# Patient Record
Sex: FEMALE | Race: WHITE | NOT HISPANIC OR LATINO | Employment: FULL TIME | URBAN - METROPOLITAN AREA
[De-identification: names, ages, dates, MRNs, and addresses within clinical notes are randomized per-mention and may not be internally consistent; named-entity substitution may affect disease eponyms.]

---

## 2023-01-18 PROBLEM — N97.9 FEMALE INFERTILITY: Status: ACTIVE | Noted: 2023-01-18

## 2023-01-18 PROBLEM — G43.909 MIGRAINE: Status: ACTIVE | Noted: 2023-01-18

## 2023-01-18 PROBLEM — Z31.41 FERTILITY TESTING: Status: ACTIVE | Noted: 2023-01-18

## 2023-01-18 PROBLEM — O36.80X0 EXAMINATION TO DETERMINE FETAL VIABILITY OF PREGNANCY (HHS-HCC): Status: ACTIVE | Noted: 2023-01-18

## 2023-01-18 PROBLEM — J01.90 ACUTE SINUSITIS: Status: ACTIVE | Noted: 2023-01-18

## 2023-01-18 PROBLEM — U07.1 COVID-19: Status: ACTIVE | Noted: 2023-01-18

## 2023-01-18 PROBLEM — M72.2 PLANTAR FASCIITIS, LEFT: Status: ACTIVE | Noted: 2023-01-18

## 2023-01-18 PROBLEM — R76.0 ANTIPHOSPHOLIPID ANTIBODY POSITIVE: Status: ACTIVE | Noted: 2023-01-18

## 2023-01-18 PROBLEM — F43.21 ADJUSTMENT DISORDER WITH DEPRESSED MOOD: Status: ACTIVE | Noted: 2023-01-18

## 2023-01-18 PROBLEM — J01.90 ACUTE RHINOSINUSITIS: Status: ACTIVE | Noted: 2023-01-18

## 2023-01-18 PROBLEM — Z12.4 ENCOUNTER FOR PAPANICOLAOU SMEAR OF CERVIX: Status: ACTIVE | Noted: 2023-01-18

## 2023-01-18 PROBLEM — E55.9 VITAMIN D DEFICIENCY: Status: ACTIVE | Noted: 2023-01-18

## 2023-01-18 PROBLEM — F32.A DEPRESSION: Status: ACTIVE | Noted: 2023-01-18

## 2023-01-18 RX ORDER — VIT C/E/ZN/COPPR/LUTEIN/ZEAXAN 250MG-90MG
25 CAPSULE ORAL DAILY
COMMUNITY
End: 2024-04-04 | Stop reason: ALTCHOICE

## 2023-01-18 RX ORDER — CALCIUM CARBONATE 300MG(750)
400 TABLET,CHEWABLE ORAL DAILY
COMMUNITY

## 2023-01-18 RX ORDER — DOXYCYCLINE 100 MG/1
100 TABLET ORAL 2 TIMES DAILY
COMMUNITY
End: 2023-03-12 | Stop reason: ALTCHOICE

## 2023-01-18 RX ORDER — AZELASTINE 1 MG/ML
1 SPRAY, METERED NASAL 2 TIMES DAILY
COMMUNITY
End: 2023-03-12 | Stop reason: ALTCHOICE

## 2023-01-18 RX ORDER — ASCORBIC ACID 500 MG
500 TABLET ORAL DAILY
COMMUNITY
End: 2024-04-03 | Stop reason: ALTCHOICE

## 2023-01-19 PROBLEM — Z12.4 ENCOUNTER FOR PAPANICOLAOU SMEAR OF CERVIX: Status: RESOLVED | Noted: 2023-01-18 | Resolved: 2023-01-19

## 2023-01-19 PROBLEM — Z31.41 FERTILITY TESTING: Status: RESOLVED | Noted: 2023-01-18 | Resolved: 2023-01-19

## 2023-03-03 ASSESSMENT — ENCOUNTER SYMPTOMS
SORE THROAT: 0
FEVER: 0
POLYPHAGIA: 0
APPETITE CHANGE: 0
CONFUSION: 0
HALLUCINATIONS: 0
EYE REDNESS: 0
EYE PAIN: 0
EYE DISCHARGE: 0
FREQUENCY: 0
DIZZINESS: 0
BLOOD IN STOOL: 0
UNEXPECTED WEIGHT CHANGE: 0
DYSURIA: 0
ABDOMINAL PAIN: 0
PALPITATIONS: 0
CHILLS: 0
COUGH: 0
HEADACHES: 0
HEMATURIA: 0
SHORTNESS OF BREATH: 0
BACK PAIN: 0
POLYDIPSIA: 0
ADENOPATHY: 0
FATIGUE: 0
NECK PAIN: 0
TROUBLE SWALLOWING: 0
VOMITING: 0
WOUND: 0
BRUISES/BLEEDS EASILY: 0

## 2023-03-03 NOTE — PROGRESS NOTES
Subjective   Patient ID: Sakshi Kahn is a 42 y.o. female who presents for Annual Exam.  Last labs:1/2023 cmp nl, tsh nl, lipid ldl 123, vit d low at 27  Is pt taking the vit d otc 2000iu 1 a day?  Due for vit d end of march    Health maintenance:  Due for mammo (had breast mri 5/2022)  Due for pap (dr cunningham)    HPI    Immunization History   Administered Date(s) Administered    Influenza, seasonal, injectable 10/13/2021    Moderna SARS-CoV-2 Vaccination 11/18/2021    Pfizer Purple Cap SARS-CoV-2 03/21/2021, 04/11/2021    Tdap 06/12/2015, 03/31/2016, 10/13/2021      Other concerns: liver cyst on ultrasound  Needs follow up order for ultrasound in aug  Still side pain on off    ER/urgicare visits in the last year- 12/2022 sinus, 12/2022 sinus, 10/2022 sinus, 4/2022 uri  Hospitalizations in the last year- none      last Pap-dr cunningham-due  H/o abn pap-none    FH ovarian, endometrial, cervical, uterine ca-    Current birth control method-mirena  No change in contraception desired    last mammo- (40-75/90)- br MRI 5/2022  Self breast exams-yes    FH br ca-none    FH colon ca-none      Exercise- walk and yoga, free apps  Diet-3 meals     last eye  appt- contacts 9/2022  No vision issues    last dental appt- jan2023    other specialists-ob gyn, podiatrist, heme    BMs-regular  Sleep-able to fall asleep and stay asleep; no snoring or apnea  no cp, swelling, sob, abd pain, n/v/d/c, blood in stool or black stools  STI testing including hiv (age 15-65) and hep c screening (18-79)-declines  Vaccines- utd    fractures in lifetime-rt wrist, toes, rt arm    FH heart attack, heart surgery-none  FH stroke-mgm        No care team member to display     Review of Systems   Constitutional:  Negative for appetite change, chills, fatigue, fever and unexpected weight change.   HENT:  Negative for congestion, ear pain, sore throat and trouble swallowing.    Eyes:  Negative for pain, discharge and redness.   Respiratory:  Negative  for cough and shortness of breath.    Cardiovascular:  Negative for chest pain and palpitations.   Gastrointestinal:  Negative for abdominal pain, blood in stool and vomiting.   Endocrine: Negative for polydipsia, polyphagia and polyuria.   Genitourinary:  Negative for dysuria, frequency, hematuria and urgency.   Musculoskeletal:  Negative for back pain and neck pain.   Skin:  Negative for rash and wound.   Allergic/Immunologic: Negative for immunocompromised state.   Neurological:  Negative for dizziness, syncope and headaches.   Hematological:  Negative for adenopathy. Does not bruise/bleed easily.   Psychiatric/Behavioral:  Negative for confusion and hallucinations.        Objective   Visit Vitals  /73 (BP Location: Right arm, Patient Position: Sitting, BP Cuff Size: Large adult)   Pulse 84   Temp 36.6 °C (97.9 °F)      BP Readings from Last 3 Encounters:   03/06/23 115/73   12/26/22 121/85   12/21/22 115/83     Wt Readings from Last 3 Encounters:   03/06/23 102 kg (225 lb 8.5 oz)   12/21/22 103 kg (228 lb)   10/16/22 104 kg (230 lb)     No results found for: CHOL  No results found for: HDL  No results found for: LDLCALC  No results found for: TRIG  No components found for: CHOLHDL  No results found for: HGBA1C   No results found for: ALBUR, [id number]   The ASCVD Risk score (Shanika DK, et al., 2019) failed to calculate for the following reasons:    Cannot find a previous HDL lab    Cannot find a previous total cholesterol lab     Physical Exam  Constitutional:       General: She is not in acute distress.     Appearance: Normal appearance. She is not ill-appearing.   HENT:      Head: Normocephalic.      Right Ear: Tympanic membrane, ear canal and external ear normal.      Left Ear: Tympanic membrane, ear canal and external ear normal.      Nose: Nose normal.      Mouth/Throat:      Mouth: Mucous membranes are moist.      Pharynx: Oropharynx is clear.   Eyes:      Extraocular Movements: Extraocular movements  intact.      Conjunctiva/sclera: Conjunctivae normal.      Pupils: Pupils are equal, round, and reactive to light.   Cardiovascular:      Rate and Rhythm: Normal rate and regular rhythm.      Heart sounds: Normal heart sounds. No murmur heard.  Pulmonary:      Effort: Pulmonary effort is normal. No respiratory distress.      Breath sounds: Normal breath sounds. No wheezing, rhonchi or rales.   Abdominal:      General: Bowel sounds are normal.      Palpations: Abdomen is soft. There is no mass.      Tenderness: There is no abdominal tenderness.   Musculoskeletal:         General: No swelling or tenderness. Normal range of motion.      Cervical back: Normal range of motion and neck supple.      Right lower leg: No edema.      Left lower leg: No edema.   Skin:     General: Skin is warm.      Findings: No rash.   Neurological:      General: No focal deficit present.      Mental Status: She is alert and oriented to person, place, and time.      Cranial Nerves: No cranial nerve deficit.      Motor: No weakness.   Psychiatric:         Mood and Affect: Mood normal.         Behavior: Behavior normal.         Assessment/Plan     1. Routine adult health maintenance    - Comprehensive Metabolic Panel; Future  - CBC and Auto Differential; Future  - Lipid Panel; Future  - TSH with reflex to Free T4 if abnormal; Future    2. Vitamin D deficiency    - Vitamin D 1,25 Dihydroxy; Future    3. Liver cyst    - US abdomen limited liver; Future  - Referral to Gastroenterology; Future    4. BMI 36.0-36.9,adult  Diet exercise      I will communicate with you via [facility] regarding messages and results. If you need help with this, you can call the support line at [phone].    IT WAS A PLEASURE TO SEE YOU TODAY. THANK YOU FOR CHOOSING US FOR YOUR HEALTHCARE NEEDS.

## 2023-03-04 PROBLEM — J01.90 ACUTE SINUSITIS: Status: RESOLVED | Noted: 2023-01-18 | Resolved: 2023-03-04

## 2023-03-04 PROBLEM — J01.90 ACUTE RHINOSINUSITIS: Status: RESOLVED | Noted: 2023-01-18 | Resolved: 2023-03-04

## 2023-03-06 ENCOUNTER — OFFICE VISIT (OUTPATIENT)
Dept: PRIMARY CARE | Facility: CLINIC | Age: 43
End: 2023-03-06
Payer: COMMERCIAL

## 2023-03-06 VITALS
HEART RATE: 84 BPM | TEMPERATURE: 97.9 F | OXYGEN SATURATION: 97 % | DIASTOLIC BLOOD PRESSURE: 73 MMHG | SYSTOLIC BLOOD PRESSURE: 115 MMHG | WEIGHT: 225.53 LBS | BODY MASS INDEX: 36.4 KG/M2

## 2023-03-06 DIAGNOSIS — E55.9 VITAMIN D DEFICIENCY: ICD-10-CM

## 2023-03-06 DIAGNOSIS — K76.89 LIVER CYST: ICD-10-CM

## 2023-03-06 DIAGNOSIS — Z00.00 ROUTINE ADULT HEALTH MAINTENANCE: Primary | ICD-10-CM

## 2023-03-06 PROCEDURE — 3008F BODY MASS INDEX DOCD: CPT | Performed by: NURSE PRACTITIONER

## 2023-03-06 PROCEDURE — 99396 PREV VISIT EST AGE 40-64: CPT | Performed by: NURSE PRACTITIONER

## 2023-03-06 PROCEDURE — 1036F TOBACCO NON-USER: CPT | Performed by: NURSE PRACTITIONER

## 2023-03-06 RX ORDER — LEVONORGESTREL 52 MG/1
1 INTRAUTERINE DEVICE INTRAUTERINE ONCE
COMMUNITY

## 2023-03-06 NOTE — PATIENT INSTRUCTIONS
Discuss mammogram order and set up appt with dr cunningham for pap    Meds refilled. The number of refills on the meds match when you need to return to the office for an appt. I recommend making your next appt today so you don't run out of your medication as it may take me up to 3 days to refill it.    Handouts given to pt:  physical handout    stop smoking    Need records from:    I recommend signing up for MyChart.    Labs:    You can use the lab in our building when fasting. The hrs are: Monday-Thursday, 7 a.m. - 6 p.m., Friday, 7 a.m. - 5 p.m., Saturday 8 a.m. - 12 noon.   No appt needed, BUT YOU DO NEED THE PAPER ORDER.    Fasting is no food, drink, gum or mints other than water for 12 hrs.   Results will be back in 2-3 business days for most labs. It is always recommended for any orders (labs, xrays, ultrasounds,MRI, ct scan, procedures etc) to check with your insurance provider for expected costs or expenses to you.     Screenings:  Pap results back in 7-14 days.  To set up mammogram, call [phone]    You will get your results via phone from my medical assistant if you do not have MyChart.  OR  You will get your results via [employer] [facility]    If a result is urgent, I will call to speak to you.    Vaccines:  ---- Tdap    ---- Flu shot    ---- Shingrix    ---- [id number]    General recommendations:  Exercise-cardio 4-5d/wk 30min each day  Diet-Breakfast-toast (my favorite Leslie Ray Delighful Multigrain or Isaias's Killer Bread Good Seed thin-sliced)/bagel/English muffin-whole wheat flour as a 1st ingredient or cereal/oatmeal/granola bar-fiber 4g or more or protein like eggs or peanut butter; optional veggies  Lunch-protein, 1/2c carb or 2 slices bread, veg 1c  Dinner-protein, fist sized carb, veg 1c  Fruit 2 a day  Dairy 2 a day-milk, soy milk, almond milk, cheese, yogurt, cottage cheese  Snacks-Protein-hard boiled egg, nuts (walnuts/almonds/pecans/pistachios 1/4c), hummus, beef/deer jerky or meat sticks; vegetable, fruit,  dairy-milk(1%, skim, almond, soy)/cheese (not a lot of cheddar)/yogurt (Greek is best-my favorite Dannon Fruit on the Bottom Greek)/cottage cheese 2%; triscuits/ popcorn/wheat thins have a lot of fiber; follow serving size on bag/box/container  increase water  Limit alcohol to 1 drink per day for women and 2 drinks per day for men (1 drink=12oz beer or 5oz wine or 1 1/2oz liquor)  Calcium: 500mg 1 twice a day if age 50 and younger and 600mg 1 twice a day if over age 50 (calcium citrate can be taken without food)  Vitamin D: 800-5000 IU/day  Limit salt to <2300mg a day if age 50 and under and <1500mg a day if over age 50/have high bp or diabetes or kidney disease  Recommend folate for childbearing age women 0.4mg per day (can be found in a multivitamin)  Recommend 18mg/dL of iron a day if age 50 and under and 8mg/dL a day if over age 50; take on an empty stomach at bedtime  Use sunscreen   Wear seatbelt  Recommend safe sex practices: using condoms everytime you have sex, discuss with a new partner about their past partners/history of STDs/drug use, avoid drinking alcohol or using drugs as this increases the chance that you will participate in high-risk sex, for oral sex help protect your mouth by having your partner use a condom (male or female), women should not douche after sex, be aware of your partner's body and your body-look for signs of a sore, blister, rash, or discharge, and have regular exams and periodic tests for STDs.  No distracted driving  No driving when under influence of substances  Wear a seatbelt  Eye dr every 1-2yrs  Dentist every 6-12 mon  Tetanus shot every 10yrs  Recommend flu vaccine in the fall  Appt in  year for physical    IT WAS A PLEASURE TO SEE YOU TODAY. THANK YOU FOR CHOOSING US FOR YOUR HEALTHCARE NEEDS.

## 2023-03-12 PROBLEM — K76.89 LIVER CYST: Status: ACTIVE | Noted: 2023-03-12

## 2023-03-12 PROBLEM — Z00.00 ROUTINE ADULT HEALTH MAINTENANCE: Status: ACTIVE | Noted: 2023-03-12

## 2023-06-23 ENCOUNTER — TELEPHONE (OUTPATIENT)
Dept: PRIMARY CARE | Facility: CLINIC | Age: 43
End: 2023-06-23
Payer: COMMERCIAL

## 2023-07-27 ENCOUNTER — TELEPHONE (OUTPATIENT)
Dept: PRIMARY CARE | Facility: CLINIC | Age: 43
End: 2023-07-27
Payer: COMMERCIAL

## 2023-07-27 DIAGNOSIS — R09.81 NASAL CONGESTION: Primary | ICD-10-CM

## 2023-07-27 NOTE — TELEPHONE ENCOUNTER
I will put in referral to an allergist. Pt can call [address] to schedule this.  In the meantime:  I recommend zyrtec over claritin.  I recommend also taking a steroid nasal spray like flonase. If pt doesn't have one at home then I recommend nasacort, nasonex, flonase sensimist or rhinocort since they don't drip and don't have a scent and don't dry the nose.  If not helping in 2wks then pt can let me know and I can send in a rx for singulair.

## 2023-08-28 PROBLEM — D64.9 ANEMIA: Status: ACTIVE | Noted: 2023-08-28

## 2023-08-28 PROBLEM — G89.29 CHRONIC RIGHT UPPER QUADRANT PAIN: Status: ACTIVE | Noted: 2023-08-28

## 2023-08-28 PROBLEM — L82.1 OTHER SEBORRHEIC KERATOSIS: Status: ACTIVE | Noted: 2021-12-01

## 2023-08-28 PROBLEM — R10.11 CHRONIC RIGHT UPPER QUADRANT PAIN: Status: ACTIVE | Noted: 2023-08-28

## 2023-08-28 PROBLEM — L91.8 OTHER HYPERTROPHIC DISORDERS OF THE SKIN: Status: ACTIVE | Noted: 2021-12-01

## 2023-08-28 RX ORDER — ACETAMINOPHEN 500 MG
1 TABLET ORAL DAILY
COMMUNITY
Start: 2023-01-25

## 2023-08-28 RX ORDER — PSYLLIUM HUSK 3.4 G/5.8G
1 POWDER ORAL DAILY
COMMUNITY
Start: 2023-04-10 | End: 2023-12-29 | Stop reason: ALTCHOICE

## 2023-08-28 RX ORDER — ORPHENADRINE CITRATE 100 MG/1
1 TABLET, EXTENDED RELEASE ORAL 2 TIMES DAILY
COMMUNITY
Start: 2023-01-25 | End: 2024-04-03 | Stop reason: ALTCHOICE

## 2023-08-28 RX ORDER — SIMETHICONE 125 MG
125 CAPSULE ORAL 4 TIMES DAILY PRN
COMMUNITY
Start: 2023-04-10 | End: 2023-12-29 | Stop reason: ALTCHOICE

## 2023-09-14 ENCOUNTER — PATIENT MESSAGE (OUTPATIENT)
Dept: PRIMARY CARE | Facility: CLINIC | Age: 43
End: 2023-09-14
Payer: COMMERCIAL

## 2023-09-14 DIAGNOSIS — R06.7 SNEEZING: ICD-10-CM

## 2023-09-14 DIAGNOSIS — R06.83 SNORING: ICD-10-CM

## 2023-09-14 DIAGNOSIS — R09.81 NASAL CONGESTION: Primary | ICD-10-CM

## 2023-09-14 DIAGNOSIS — R05.1 ACUTE COUGH: ICD-10-CM

## 2023-09-14 DIAGNOSIS — R51.9 DAILY HEADACHE: ICD-10-CM

## 2023-10-05 ENCOUNTER — CONSULT (OUTPATIENT)
Dept: ALLERGY | Facility: CLINIC | Age: 43
End: 2023-10-05
Payer: COMMERCIAL

## 2023-10-05 VITALS — WEIGHT: 231 LBS | BODY MASS INDEX: 37.28 KG/M2

## 2023-10-05 DIAGNOSIS — H10.45 CHRONIC ALLERGIC CONJUNCTIVITIS: ICD-10-CM

## 2023-10-05 DIAGNOSIS — J30.1 SEASONAL ALLERGIC RHINITIS DUE TO POLLEN: Primary | ICD-10-CM

## 2023-10-05 DIAGNOSIS — J30.89 SEASONAL ALLERGIC RHINITIS DUE TO OTHER ALLERGIC TRIGGER: ICD-10-CM

## 2023-10-05 PROCEDURE — 99204 OFFICE O/P NEW MOD 45 MIN: CPT | Performed by: ALLERGY & IMMUNOLOGY

## 2023-10-05 PROCEDURE — 95004 PERQ TESTS W/ALRGNC XTRCS: CPT | Performed by: ALLERGY & IMMUNOLOGY

## 2023-10-05 RX ORDER — OLOPATADINE HYDROCHLORIDE AND MOMETASONE FUROATE 25; 665 UG/1; UG/1
2 SPRAY, METERED NASAL 2 TIMES DAILY
Qty: 29 G | Refills: 11 | Status: SHIPPED | OUTPATIENT
Start: 2023-10-05 | End: 2024-01-02 | Stop reason: ALTCHOICE

## 2023-10-05 NOTE — PROGRESS NOTES
Subjective   Patient ID: Sakshi Kahn is a 43 y.o. female.    Chief Complaint:    Here to evaluate for ARC    PND  Sneezing but not excessive  Pressure of her sinuses  Sinus issues most of her life  Headaches bother her.  Early summer '23 - had a dry cough and worse snoring per her   Persistent coughing, and was affecting her sleep.   Worse at night  Cough  is gone now.     Started in childhood to some degree but worse x 6 months.     Meds:  Zyrtec  OTC Nasacort  Better about 50%  Would prefer to limit medications.     Trigger: spring, fall  Better: winter    Fh: both parents get allergy shots as older adults.     Sh: non-profit director and staff development;  a nurse at [employer]    Review of Systems   All other systems reviewed and are negative.      Objective   Physical Exam  Constitutional:       General: She is not in acute distress.     Appearance: Normal appearance. She is not ill-appearing.   HENT:      Head: Normocephalic and atraumatic.      Right Ear: Tympanic membrane, ear canal and external ear normal.      Left Ear: Tympanic membrane, ear canal and external ear normal.      Nose: Nose normal. No congestion or rhinorrhea.      Mouth/Throat:      Mouth: Mucous membranes are moist.      Pharynx: Oropharynx is clear. No oropharyngeal exudate or posterior oropharyngeal erythema.   Eyes:      General:         Right eye: No discharge.         Left eye: No discharge.      Conjunctiva/sclera: Conjunctivae normal.   Cardiovascular:      Rate and Rhythm: Normal rate and regular rhythm.      Heart sounds: Normal heart sounds. No murmur heard.     No friction rub. No gallop.   Pulmonary:      Effort: Pulmonary effort is normal. No respiratory distress.      Breath sounds: Normal breath sounds. No stridor. No wheezing, rhonchi or rales.   Chest:      Chest wall: No tenderness.   Abdominal:      General: Abdomen is flat.      Palpations: Abdomen is soft.   Musculoskeletal:         General: Normal  range of motion.      Cervical back: Normal range of motion and neck supple.   Lymphadenopathy:      Cervical: No cervical adenopathy.   Skin:     General: Skin is warm and dry.      Findings: No erythema, lesion or rash.   Neurological:      General: No focal deficit present.      Mental Status: She is alert. Mental status is at baseline.   Psychiatric:         Mood and Affect: Mood normal.         Behavior: Behavior normal.         Thought Content: Thought content normal.         Judgment: Judgment normal.     Laboratory:   Percutaneous Skin Testing: see skin tests    Assessment/Plan      This patient will return to complete allergy testing with intradermal skin tests. Be sure to be off antihistamines for 3 days.    I'd prefer to treat with avoidance measures and allergy medications.   If this is not successful then we can discuss allergy shots further.

## 2023-10-27 ENCOUNTER — OFFICE VISIT (OUTPATIENT)
Dept: URGENT CARE | Facility: CLINIC | Age: 43
End: 2023-10-27
Payer: COMMERCIAL

## 2023-10-27 VITALS
HEART RATE: 72 BPM | OXYGEN SATURATION: 98 % | DIASTOLIC BLOOD PRESSURE: 86 MMHG | TEMPERATURE: 98.3 F | RESPIRATION RATE: 20 BRPM | SYSTOLIC BLOOD PRESSURE: 132 MMHG

## 2023-10-27 DIAGNOSIS — J01.90 ACUTE SINUSITIS, RECURRENCE NOT SPECIFIED, UNSPECIFIED LOCATION: Primary | ICD-10-CM

## 2023-10-27 PROCEDURE — 99203 OFFICE O/P NEW LOW 30 MIN: CPT | Performed by: PHYSICIAN ASSISTANT

## 2023-10-27 PROCEDURE — 3008F BODY MASS INDEX DOCD: CPT | Performed by: PHYSICIAN ASSISTANT

## 2023-10-27 PROCEDURE — 1036F TOBACCO NON-USER: CPT | Performed by: PHYSICIAN ASSISTANT

## 2023-10-27 RX ORDER — BENZONATATE 100 MG/1
100 CAPSULE ORAL 3 TIMES DAILY PRN
Qty: 30 CAPSULE | Refills: 0 | Status: SHIPPED | OUTPATIENT
Start: 2023-10-27 | End: 2023-12-26 | Stop reason: ALTCHOICE

## 2023-10-27 RX ORDER — DOXYCYCLINE 100 MG/1
100 CAPSULE ORAL 2 TIMES DAILY
Qty: 20 CAPSULE | Refills: 0 | Status: SHIPPED | OUTPATIENT
Start: 2023-10-27 | End: 2023-11-06

## 2023-10-27 ASSESSMENT — ENCOUNTER SYMPTOMS
COUGH: 1
SINUS PRESSURE: 1

## 2023-10-27 ASSESSMENT — PAIN SCALES - GENERAL: PAINLEVEL: 7

## 2023-10-27 NOTE — PROGRESS NOTES
Subjective   Patient ID: Sakshi Kahn is a 43 y.o. female.    Patient is a 43-year-old female complains of worsening congestion, sinus pressure and cough that she has been experiencing for the past 2 to 3 weeks.  Patient denies fever, chills or myalgia.  Patient has no history of asthma or COPD and does not smoke.  Patient states that she was treated with amoxicillin by her primary care physician for her symptoms but has experienced no improvement and reports that her cough is now becoming more productive.        Cough    The following portions of the chart were reviewed this encounter and updated as appropriate:       Review of Systems   HENT:  Positive for congestion and sinus pressure.    Respiratory:  Positive for cough.    All other systems reviewed and are negative.    Objective   Physical Exam  Vitals and nursing note reviewed.   Constitutional:       Appearance: Normal appearance. She is normal weight.   HENT:      Head: Normocephalic and atraumatic.      Right Ear: Tympanic membrane, ear canal and external ear normal.      Left Ear: Tympanic membrane, ear canal and external ear normal.      Nose: Nose normal. No congestion or rhinorrhea.      Mouth/Throat:      Mouth: Mucous membranes are moist.      Pharynx: Oropharynx is clear. No oropharyngeal exudate or posterior oropharyngeal erythema.   Eyes:      Extraocular Movements: Extraocular movements intact.      Conjunctiva/sclera: Conjunctivae normal.      Pupils: Pupils are equal, round, and reactive to light.   Cardiovascular:      Rate and Rhythm: Normal rate and regular rhythm.      Pulses: Normal pulses.      Heart sounds: Normal heart sounds.   Pulmonary:      Effort: Pulmonary effort is normal. No respiratory distress.      Breath sounds: Normal breath sounds. No stridor. No wheezing, rhonchi or rales.   Musculoskeletal:      Cervical back: Normal range of motion and neck supple.   Skin:     General: Skin is warm and dry.      Capillary Refill:  Capillary refill takes less than 2 seconds.   Neurological:      General: No focal deficit present.      Mental Status: She is alert and oriented to person, place, and time.   Psychiatric:         Mood and Affect: Mood normal.         Behavior: Behavior normal.         Thought Content: Thought content normal.         Judgment: Judgment normal.     Assessment/Plan   Physical exam findings as noted above.  Patient was provided with prescriptions for doxycycline 100 mg and Tessalon 100 mg.  Patient was advised that she needs to contact her primary care physician for further evaluation if her symptoms do not improve or to report to an emergency department if she develops any acute worsening of her symptoms.  Patient verbalizes clear understanding of the above instructions.    CLINICAL IMPRESSION:  Acute Sinusitis    Diagnoses and all orders for this visit:  Acute sinusitis, recurrence not specified, unspecified location  -     doxycycline (Monodox) 100 mg capsule; Take 1 capsule (100 mg) by mouth 2 times a day for 10 days. Take with at least 8 ounces (large glass) of water, do not lie down for 30 minutes after  -     benzonatate (Tessalon Perles) 100 mg capsule; Take 1 capsule (100 mg) by mouth 3 times a day as needed for cough.

## 2023-11-20 ENCOUNTER — APPOINTMENT (OUTPATIENT)
Dept: ALLERGY | Facility: CLINIC | Age: 43
End: 2023-11-20
Payer: COMMERCIAL

## 2023-12-05 LAB
NON-UH HIE A/G RATIO: 1.4
NON-UH HIE ALB: 3.8 G/DL (ref 3.4–5)
NON-UH HIE ALK PHOS: 72 UNIT/L (ref 45–117)
NON-UH HIE BILIRUBIN, TOTAL: 0.7 MG/DL (ref 0.3–1.2)
NON-UH HIE BUN/CREAT RATIO: 16.2
NON-UH HIE BUN: 13 MG/DL (ref 9–23)
NON-UH HIE CALCIUM: 9.2 MG/DL (ref 8.7–10.4)
NON-UH HIE CALCULATED LDL CHOLESTEROL: 133 MG/DL (ref 60–130)
NON-UH HIE CALCULATED OSMOLALITY: 278 MOSM/KG (ref 275–295)
NON-UH HIE CHLORIDE: 105 MMOL/L (ref 98–107)
NON-UH HIE CHOLESTEROL: 205 MG/DL (ref 100–200)
NON-UH HIE CO2, VENOUS: 26 MMOL/L (ref 20–31)
NON-UH HIE CREATININE: 0.8 MG/DL (ref 0.5–0.8)
NON-UH HIE GFR AA: >60
NON-UH HIE GLOBULIN: 2.7 G/DL
NON-UH HIE GLOMERULAR FILTRATION RATE: >60 ML/MIN/1.73M?
NON-UH HIE GLUCOSE: 97 MG/DL (ref 74–106)
NON-UH HIE GOT: 17 UNIT/L (ref 15–37)
NON-UH HIE GPT: 13 UNIT/L (ref 10–49)
NON-UH HIE HCT: 41.9 % (ref 36–46)
NON-UH HIE HDL CHOLESTEROL: 50 MG/DL (ref 40–60)
NON-UH HIE HGB: 14.2 G/DL (ref 12–16)
NON-UH HIE INSTR WBC ND: 6.9
NON-UH HIE K: 4.4 MMOL/L (ref 3.5–5.1)
NON-UH HIE MCH: 32.6 PG (ref 27–34)
NON-UH HIE MCHC: 33.8 G/DL (ref 32–37)
NON-UH HIE MCV: 96.5 FL (ref 80–100)
NON-UH HIE MPV: 7.5 FL (ref 7.4–10.4)
NON-UH HIE NA: 139 MMOL/L (ref 135–145)
NON-UH HIE PLATELET: 246 X10 (ref 150–450)
NON-UH HIE RBC: 4.34 X10 (ref 4.2–5.4)
NON-UH HIE RDW: 12.5 % (ref 11.5–14.5)
NON-UH HIE TOTAL CHOL/HDL CHOL RATIO: 4.1
NON-UH HIE TOTAL PROTEIN: 6.5 G/DL (ref 5.7–8.2)
NON-UH HIE TRIGLYCERIDES: 109 MG/DL (ref 30–150)
NON-UH HIE TSH: 2.03 UIU/ML (ref 0.55–4.78)
NON-UH HIE WBC: 6.9 X10 (ref 4.5–11)

## 2023-12-06 ENCOUNTER — TELEPHONE (OUTPATIENT)
Dept: PRIMARY CARE | Facility: CLINIC | Age: 43
End: 2023-12-06
Payer: COMMERCIAL

## 2023-12-06 NOTE — TELEPHONE ENCOUNTER
----- Message from PELON Clifton sent at 12/5/2023  4:45 PM EST -----  Trigs and hdl normal.  Ldl high at 133. Goal <100. Decr fats and incr fiber. Last time it was 123. We can discuss this more at your appt.  Sugar (aka glucose), kidney function, liver function and electrolytes in the CMP (comprehensive metabolic panel) were normal.  TSH (thyroid test) was normal.  CBC (complete blood count) was normal which looks at infection and anemia markers.

## 2023-12-22 ENCOUNTER — OFFICE VISIT (OUTPATIENT)
Dept: URGENT CARE | Facility: CLINIC | Age: 43
End: 2023-12-22
Payer: COMMERCIAL

## 2023-12-22 VITALS
TEMPERATURE: 98.6 F | RESPIRATION RATE: 16 BRPM | WEIGHT: 230 LBS | BODY MASS INDEX: 37.12 KG/M2 | OXYGEN SATURATION: 98 % | SYSTOLIC BLOOD PRESSURE: 122 MMHG | DIASTOLIC BLOOD PRESSURE: 79 MMHG | HEART RATE: 94 BPM

## 2023-12-22 DIAGNOSIS — H10.31 ACUTE CONJUNCTIVITIS OF RIGHT EYE, UNSPECIFIED ACUTE CONJUNCTIVITIS TYPE: Primary | ICD-10-CM

## 2023-12-22 PROCEDURE — 3008F BODY MASS INDEX DOCD: CPT | Performed by: PHYSICIAN ASSISTANT

## 2023-12-22 PROCEDURE — 99213 OFFICE O/P EST LOW 20 MIN: CPT | Performed by: PHYSICIAN ASSISTANT

## 2023-12-22 PROCEDURE — 1036F TOBACCO NON-USER: CPT | Performed by: PHYSICIAN ASSISTANT

## 2023-12-22 RX ORDER — OFLOXACIN 3 MG/ML
1 SOLUTION/ DROPS OPHTHALMIC 4 TIMES DAILY
Qty: 5 ML | Refills: 0 | Status: SHIPPED | OUTPATIENT
Start: 2023-12-22 | End: 2023-12-27

## 2023-12-22 ASSESSMENT — ENCOUNTER SYMPTOMS
EYE DISCHARGE: 0
EYE ITCHING: 1
EYE REDNESS: 1
PHOTOPHOBIA: 0
SINUS COMPLAINT: 1
EYE PAIN: 0

## 2023-12-22 ASSESSMENT — PAIN SCALES - GENERAL: PAINLEVEL: 7

## 2023-12-22 NOTE — PROGRESS NOTES
Subjective   Patient ID: Sakshi Kahn is a 43 y.o. female.    Patient is a 43-year-old female who complains of redness and irritation to her right eye that she has been experiencing for the past 2 days.  Patient states that the left eye is asymptomatic.  Patient has noted no matting or discharge to her right eye.  Patient does not wear contact lenses and states that her vision is intact and unchanged.  Patient reports no injury or foreign body to her right eye.      Sore Throat     Conjunctivitis  Sinus Problem    The following portions of the chart were reviewed this encounter and updated as appropriate:       Review of Systems   Eyes:  Positive for redness and itching. Negative for photophobia, pain, discharge and visual disturbance.   All other systems reviewed and are negative.    Objective   Physical Exam  Vitals and nursing note reviewed.   Constitutional:       Appearance: Normal appearance. She is normal weight.   HENT:      Head: Normocephalic and atraumatic.      Nose: Nose normal.      Mouth/Throat:      Mouth: Mucous membranes are moist.      Pharynx: Oropharynx is clear.   Eyes:      General:         Right eye: No discharge.         Left eye: No discharge.      Extraocular Movements: Extraocular movements intact.      Pupils: Pupils are equal, round, and reactive to light.      Comments: Injection and erythema is noted to the right conjunctiva.  No matting or discharge is noted to the right eye.  Right superior and inferior eyelids are clear without erythema or edema.  Right periorbital skin is clear without erythema or edema.  Exam of the left conjunctiva, eyelids and periorbital skin is unremarkable.  Pupils are equal, round and reactive to light and accommodation and the patient demonstrates full extraocular range of motion bilaterally.   Cardiovascular:      Pulses: Normal pulses.   Pulmonary:      Effort: Pulmonary effort is normal.      Breath sounds: Normal breath sounds.   Skin:      General: Skin is warm and dry.      Capillary Refill: Capillary refill takes less than 2 seconds.   Neurological:      General: No focal deficit present.      Mental Status: She is alert and oriented to person, place, and time.   Psychiatric:         Mood and Affect: Mood normal.         Behavior: Behavior normal.         Thought Content: Thought content normal.         Judgment: Judgment normal.     Assessment/Plan   Physical exam findings as noted above.  Patient was provided with a prescription for ofloxacin 0.3% ophthalmic solution and instructions for application were discussed.  Patient verbalizes clear understanding of same.    CLINICAL IMPRESSION:  Acute Conjunctivitis Right Eye    Diagnoses and all orders for this visit:  Acute conjunctivitis of right eye, unspecified acute conjunctivitis type  -     ofloxacin (Ocuflox) 0.3 % ophthalmic solution; Administer 1 drop into the right eye 4 times a day for 5 days.

## 2023-12-26 PROBLEM — H10.31 ACUTE CONJUNCTIVITIS OF RIGHT EYE: Status: RESOLVED | Noted: 2023-12-22 | Resolved: 2023-12-26

## 2023-12-26 PROBLEM — J01.90 ACUTE SINUSITIS: Status: RESOLVED | Noted: 2023-01-18 | Resolved: 2023-12-26

## 2023-12-26 ASSESSMENT — ENCOUNTER SYMPTOMS
SHORTNESS OF BREATH: 0
WHEEZING: 0
CONSTITUTIONAL NEGATIVE: 1

## 2023-12-26 NOTE — PROGRESS NOTES
"Subjective   Patient ID: Sakshi Kahn is a 43 y.o. female who presents for Follow-up.  Last physical:3/6/23  Last labs- 12/2023 Trigs and hdl normal.  Ldl high at 133. Goal <100. Decr fats and incr fiber. Last time it was 123. We can discuss this more at your appt.  Sugar (aka glucose), kidney function, liver function and electrolytes in the CMP (comprehensive metabolic panel) were normal.  TSH (thyroid test) was normal.  CBC (complete blood count) was normal which looks at infection and anemia markers.  Due to check vit d at next labwork    Does pt want flu shot? No     Is pt fasting?  no   Has pt had a mammogram since march? 7/20/23  Has pt had a pap since march? No due in June per dr cunningham  Did pt set up colonoscopy appt with dr rizvi? No     Any other questions or concerns that pt wants to discuss today?  Referral for sleep apnea  Weight loss discussion -friend on adipex    HPI      No results found for: \"CHOL\"  No results found for: \"TRIG\"  No results found for: \"HDL\"  No results found for: \"LDL\"  TSH   Date Value Ref Range Status   02/28/2020 1.63 0.44 - 3.98 mIU/L Final     Comment:      TSH testing is performed using different testing    methodology at [facility] than at other    [facility]. Direct result comparisons should    only be made within the same method.       No results found for: \"A1C\"  No components found for: \"POCA1C\"  No results found for: \"ALBUR\"  No components found for: \"POCALBUR\"    Snoring, daytime sleepiness; no gasping at night  Stop bang=3    Exercise-hard to do workouts with toddler, working full time,  works full time and goes to school  Diet-eating fairly healthy    Immunization History   Administered Date(s) Administered    Flu vaccine (IIV4), preservative free *Check age/dose* 12/10/2020    Influenza, seasonal, injectable 10/13/2021    Moderna SARS-CoV-2 Vaccination 11/18/2021    Pfizer Purple Cap SARS-CoV-2 03/21/2021, 04/11/2021    Tdap vaccine, " age 7 year and older (BOOSTRIX) 06/12/2015, 03/31/2016, 10/13/2021        No care team member to display     Review of Systems   Constitutional: Negative.    Respiratory:  Negative for shortness of breath and wheezing.    Cardiovascular:  Negative for chest pain.       Objective   Visit Vitals  /82   Pulse 90   Temp 36.4 °C (97.5 °F)      BP Readings from Last 3 Encounters:   12/29/23 120/82   12/22/23 122/79   10/27/23 132/86     Wt Readings from Last 3 Encounters:   12/29/23 104 kg (228 lb 9.6 oz)   12/22/23 104 kg (230 lb)   10/05/23 105 kg (231 lb)       The ASCVD Risk score (Shanika HU, et al., 2019) failed to calculate for the following reasons:    Cannot find a previous HDL lab    Cannot find a previous total cholesterol lab     Physical Exam  Constitutional:       General: She is not in acute distress.     Appearance: Normal appearance.   Neurological:      Mental Status: She is alert.       Assessment/Plan   Diagnoses and all orders for this visit:  Pure hypercholesterolemia  -     Lipid Panel; Future  BMI 36.0-36.9,adult  Class 2 severe obesity due to excess calories with serious comorbidity and body mass index (BMI) of 36.0 to 36.9 in adult (CMS/[facility])  Suspected sleep apnea  -     In-Center Sleep Study (Non-Sleep Provider Only); Future  Other orders  -     Follow Up In Primary Care - Health Maintenance; Future

## 2023-12-29 ENCOUNTER — OFFICE VISIT (OUTPATIENT)
Dept: PRIMARY CARE | Facility: CLINIC | Age: 43
End: 2023-12-29
Payer: COMMERCIAL

## 2023-12-29 VITALS
SYSTOLIC BLOOD PRESSURE: 120 MMHG | OXYGEN SATURATION: 95 % | DIASTOLIC BLOOD PRESSURE: 82 MMHG | HEIGHT: 66 IN | TEMPERATURE: 97.5 F | HEART RATE: 90 BPM | BODY MASS INDEX: 36.74 KG/M2 | WEIGHT: 228.6 LBS

## 2023-12-29 DIAGNOSIS — R29.818 SUSPECTED SLEEP APNEA: ICD-10-CM

## 2023-12-29 DIAGNOSIS — E78.00 PURE HYPERCHOLESTEROLEMIA: Primary | ICD-10-CM

## 2023-12-29 DIAGNOSIS — E66.01 CLASS 2 SEVERE OBESITY DUE TO EXCESS CALORIES WITH SERIOUS COMORBIDITY AND BODY MASS INDEX (BMI) OF 36.0 TO 36.9 IN ADULT (MULTI): ICD-10-CM

## 2023-12-29 PROBLEM — E66.812 CLASS 2 SEVERE OBESITY DUE TO EXCESS CALORIES WITH SERIOUS COMORBIDITY AND BODY MASS INDEX (BMI) OF 36.0 TO 36.9 IN ADULT: Status: ACTIVE | Noted: 2023-12-29

## 2023-12-29 PROCEDURE — 1036F TOBACCO NON-USER: CPT | Performed by: NURSE PRACTITIONER

## 2023-12-29 PROCEDURE — 3008F BODY MASS INDEX DOCD: CPT | Performed by: NURSE PRACTITIONER

## 2023-12-29 PROCEDURE — 99214 OFFICE O/P EST MOD 30 MIN: CPT | Performed by: NURSE PRACTITIONER

## 2023-12-29 ASSESSMENT — PATIENT HEALTH QUESTIONNAIRE - PHQ9
SUM OF ALL RESPONSES TO PHQ9 QUESTIONS 1 AND 2: 0
1. LITTLE INTEREST OR PLEASURE IN DOING THINGS: NOT AT ALL
2. FEELING DOWN, DEPRESSED OR HOPELESS: NOT AT ALL

## 2023-12-29 NOTE — PATIENT INSTRUCTIONS
Set up pap with dr cunningham or check when due    Set up colonoscopy with dr nieves:    [facility] ([address]  [address] [facility]  [address]  [place], [place] [id number] [phone]    Set up sleep study    Recheck cholesterol lab in3mon    [id number] anna a day  fluids    Meds for weight loss:  Wegovy, saxenda, zepbound, qsymia, contrave-check pharmacy insurance for coverage.  These meds are not well covered even with a prior authorization.  Ozempic and Mounjaro are only covered if you have diabetes.    If nothing is covered, you can consider:  Qsymia-$99 thru [facility]  OR  Contrave-$98 thru [facility]  These are pharmacies the  has contracted with to give a discounted price.      Please read this information sheet regarding QSYMIA and birth defects:  [email or web address]    Officially you should use a cervical cap or diaphragm with a condom  (fitted by an ob gyn)  OR  Combined estrogen and progesterone birth control pill, progesterone only pill or vaginal ring with a condom    to prevent pregnancy.    If someone gets pregnant on this, their baby could have a birth defect-cleft lip.    I would also recommend no more than 2 alcohol drinks per day while on qsymia. It's ok to skip one day of qsymia while on the first 2 doses but not on the other doses.    Let me know which med you would like and which pharmacy to send it to.  Consider adipex if none covered    Goal LDL <100 (133)    Exercise-cardio 4-5d/wk 30min each day  Diet-Breakfast-toast (my favorite Leslie Ray Delightful multi-grain and Isaias's Killer Bread thin-sliced Good Seed)/bagel/English muffin-whole wheat flour as a 1st ingredient or cereal/oatmeal/granola bar-fiber 4g or more; protein like eggs or peanut butter is Ok  Lunch-protein, veg 1c  Dinner-protein, veg 1c; if having potatoes, rice, noodles, or pasta-->fist sized; could try brown rice or whole wheat pasta or  quinoa  Fruit 2 a day  Dairy 2 a day-milk, almond milk, soy milk, yogurt, cottage cheese, yogurt  Snacks-Protein-hard boiled egg, nuts (walnuts/almonds/pecans/pistachios 1/4c), hummus, beef/deer jerky; vegetable, fruit, dairy-milk(1%, skim, almond, soy)/cheese (not a lot of cheddar)/yogurt (Greek is best-my favorite Dannon Fruit on the Bottom Greek)/cottage cheese 2%; triscuits, popcorn have a lot of fiber; serving size  Water  Limit alcohol to 2 drinks per day (1 drink=12oz beer or 5oz wine or 1 1/2oz liquor)  appt in  3  months; be fasting      I will communicate with you via [employer] regarding messages and results. If you need help with this, you can call the support line at [phone].    IT WAS A PLEASURE TO SEE YOU TODAY. THANK YOU FOR CHOOSING US FOR YOUR HEALTHCARE NEEDS.

## 2024-01-02 DIAGNOSIS — Z12.11 SCREEN FOR COLON CANCER: Primary | ICD-10-CM

## 2024-01-10 ENCOUNTER — TELEPHONE (OUTPATIENT)
Dept: PRIMARY CARE | Facility: CLINIC | Age: 44
End: 2024-01-10
Payer: COMMERCIAL

## 2024-01-10 RX ORDER — NALTREXONE HYDROCHLORIDE AND BUPROPION HYDROCHLORIDE 8; 90 MG/1; MG/1
TABLET, EXTENDED RELEASE ORAL
Qty: 120 TABLET | Refills: 5 | Status: SHIPPED | OUTPATIENT
Start: 2024-01-10 | End: 2024-03-08 | Stop reason: SINTOL

## 2024-01-10 NOTE — TELEPHONE ENCOUNTER
----- Message from Sakshi Kahn sent at 1/10/2024  3:36 PM EST -----  Regarding: Medication  Contact: [phone]  As we suspected, none of the weight loss medications we discussed are covered by my insurance.  I'd like to consider Contrave, since $100 a month seems like something we can manage, at least for several months.  If you feel comfortable with that, I'd appreciate a prescription.  How do I access the information about the discount rate through [employer]?

## 2024-01-10 NOTE — TELEPHONE ENCOUNTER
I will send in the qsymia to [facility].  Call [phone] to give them your address etc and payment information.    Please make an appt with me in 6wks to assess how you are doing on the med.      Thanks,  Heike Gonzalez, APRN-CNP

## 2024-01-11 ENCOUNTER — APPOINTMENT (OUTPATIENT)
Dept: ALLERGY | Facility: CLINIC | Age: 44
End: 2024-01-11
Payer: COMMERCIAL

## 2024-01-15 DIAGNOSIS — Z12.11 COLON CANCER SCREENING: ICD-10-CM

## 2024-01-15 PROBLEM — M79.89 SWELLING OF LOWER EXTREMITY: Status: ACTIVE | Noted: 2024-01-15

## 2024-01-15 PROBLEM — R31.9 HEMATURIA: Status: ACTIVE | Noted: 2024-01-15

## 2024-01-15 PROBLEM — G89.18 POSTOPERATIVE PAIN: Status: ACTIVE | Noted: 2024-01-15

## 2024-01-15 PROBLEM — D68.61: Status: ACTIVE | Noted: 2024-01-15

## 2024-01-15 PROBLEM — M79.662 BILATERAL CALF PAIN: Status: ACTIVE | Noted: 2024-01-15

## 2024-01-15 PROBLEM — F41.9 ANXIETY: Status: ACTIVE | Noted: 2017-07-28

## 2024-01-15 PROBLEM — M54.40 ACUTE BACK PAIN WITH SCIATICA: Status: ACTIVE | Noted: 2024-01-15

## 2024-01-15 PROBLEM — M79.661 BILATERAL CALF PAIN: Status: ACTIVE | Noted: 2024-01-15

## 2024-01-15 PROBLEM — O99.119: Status: ACTIVE | Noted: 2024-01-15

## 2024-01-15 PROBLEM — R10.9 RIGHT FLANK PAIN: Status: ACTIVE | Noted: 2023-01-31

## 2024-01-15 PROBLEM — R23.2 FLUSHING: Status: ACTIVE | Noted: 2024-01-15

## 2024-01-15 PROBLEM — Z86.59 HISTORY OF DEPRESSION: Status: ACTIVE | Noted: 2024-01-15

## 2024-01-15 PROBLEM — Z86.19 HISTORY OF SEXUALLY TRANSMITTED DISEASE: Status: ACTIVE | Noted: 2024-01-15

## 2024-01-15 RX ORDER — SODIUM, POTASSIUM,MAG SULFATES 17.5-3.13G
1 SOLUTION, RECONSTITUTED, ORAL ORAL EVERY 12 HOURS
Qty: 2 EACH | Refills: 0 | Status: SHIPPED | OUTPATIENT
Start: 2024-01-15 | End: 2024-04-03 | Stop reason: ALTCHOICE

## 2024-01-15 RX ORDER — LETROZOLE 2.5 MG/1
TABLET, FILM COATED ORAL
COMMUNITY
Start: 2019-11-14 | End: 2024-04-03 | Stop reason: ALTCHOICE

## 2024-01-15 RX ORDER — CLOMIPHENE CITRATE 50 MG/1
TABLET ORAL
COMMUNITY
Start: 2019-06-13 | End: 2024-04-03 | Stop reason: ALTCHOICE

## 2024-01-15 RX ORDER — LEUPROLIDE ACETATE 1 MG/0.2ML
KIT SUBCUTANEOUS
COMMUNITY
Start: 2020-01-02 | End: 2024-04-03 | Stop reason: ALTCHOICE

## 2024-01-15 RX ORDER — VENLAFAXINE HYDROCHLORIDE 37.5 MG/1
37.5 CAPSULE, EXTENDED RELEASE ORAL
COMMUNITY
Start: 2018-07-05 | End: 2024-04-03 | Stop reason: ALTCHOICE

## 2024-01-15 RX ORDER — GANIRELIX ACETATE 250 UG/.5ML
INJECTION, SOLUTION SUBCUTANEOUS
COMMUNITY
Start: 2020-01-02 | End: 2024-04-03 | Stop reason: ALTCHOICE

## 2024-01-15 RX ORDER — FLUTICASONE PROPIONATE 50 MCG
1 SPRAY, SUSPENSION (ML) NASAL
COMMUNITY
Start: 2018-07-05 | End: 2024-04-03 | Stop reason: ALTCHOICE

## 2024-01-15 RX ORDER — ERGOCALCIFEROL 1.25 MG/1
CAPSULE ORAL
COMMUNITY
Start: 2019-11-27 | End: 2024-04-04 | Stop reason: ALTCHOICE

## 2024-01-15 RX ORDER — CHORIONIC GONADOTROPIN 10000 UNIT
KIT INTRAMUSCULAR
COMMUNITY
Start: 2019-11-18 | End: 2024-04-03 | Stop reason: ALTCHOICE

## 2024-01-15 RX ORDER — PROGESTERONE 200 MG/1
CAPSULE ORAL
COMMUNITY
Start: 2019-07-12 | End: 2024-04-03 | Stop reason: ALTCHOICE

## 2024-01-16 NOTE — PROGRESS NOTES
Patient ID: Sakshi Kahn is a 43 y.o. female.    Subjective    HPI  Ms. Sakshi Kahn is a 44 y/o F with history of a provoked DVT who presents for her annual follow-up for DVT and antiphospholipid syndrome. Currently on Xarelto.     Since last visit, patient reports that she continues to tolerate the Xarelto well with no new side effects. Denies any unexplained blood loss. No new chest pain, cough or shortness of breath. No new lower extremity edema. She has an IUD in place and has light periods. She is interested in trying Contrave for weight loss. Patient has an upcoming colonoscopy scheduled on 2/19/2024. No complaints today.     Patient's past medical history, surgical history, family history and social history reviewed.     Objective    Vitals:    01/18/24 0848   BP: 107/70   Pulse: 72   Resp: 16   Temp: 36.5 °C (97.7 °F)   SpO2: 97%      LMP 12/18/2023 (Exact Date)      Review of Systems:   Review of Systems:    Positive per HPI, otherwise negative.     Physical Exam:   Constitutional: Patient appears in no acute distress.   Sitting comfortably in chair.  Eyes: EOMI, clear sclera  ENMT: mucous membranes moist, no apparent injury  Head/Neck: Neck supple, no JVD  Respiratory/Thorax: Patent airways, CTAB, normal  breath sounds, no increased work of breathing  Cardiovascular: Regular, rate and rhythm, no murmurs  Gastrointestinal: Nondistended, soft, non-tender,  no rebound tenderness or guarding, no masses palpable  Extremities: normal extremities, no cyanosis edema,  no swelling  Neurological: alert and oriented x3, nonfocal, normal  speech and hearing  Lymphatic: No palpable lymphadenopathy in cervical,  axillary  lymph nodes.  Spleen appears normal size.  Psychological: Appropriate mood and behavior, normal  affect  Skin: Warm and dry, no lesions, no rashes     Performance Status:  Symptomatic; fully ambulatory    Labs/Imaging/Pathology: personally reviewed reports and images in Epic electronic  medical record system. Pertinent results as it related to the plan represented in below in assessment and plan.      Assessment/Plan   1. Antiphospholipid syndrome  2. History of provoked RLE DVT  3. Planned for in-vitro fertilization      Underwent screening for thrombophilia during initial visit 12/2/19. Testing returned positive for anticardiolipin IgM and beta2 glycoprotein IgM antibodies x2. She did also have a positive lupus anticoagulant however it was gone 12 weeks later.  Discussed  with patient the [place] criteria for antiphospholipid syndrome which includes meeting at least one of the clinical criteria (a vascular thrombosis or pregnancy morbidity) along with at least one of the laboratory criteria which includes positive anticardiolipin  or beta-2 glycoprotein antibodies or a positive lupus anticoagulant.  Given IgM was persistently elevated x2 she does meet criteria  - discussed that DOACs are thought to be inferior to warfarin however this data is strongest for triple positive disease, and I would still consider DOAC for her given ease of administration and lack of interactions  - Given that she only had 2 positive tests and the inconvenience of coumadin, will plan to start with DOAC  - Discussed different options and we agreed on starting Xarelto 20 mg a day.       2/2/22:  - Patient had as successful pregnancy and is now 6 weeks postpartum   - PT did have a complicated pregnancy at the end with excessive postpartum bleeding  - It was ultimately thought to be related to the aspirin, which has been discontinued   - Patient now has hopes for no hopes of pregnancies  - We discussed returning back to Xarelto and she can switch at time whenever she would be due for her  Lovenox  - We did discussed if her bleeding was to increase, that we can consider Eliquis as an alternative  - We also discussed that no contraindications to proceed with Mirena.  - There is some slightly increase risk of blood clots,  however she is going to be on lifelong blood thinners, however there are no clear contraindications  and furthermore it can help with her heavy menstruations   - RTC in 3 months with a CBC CMP, iron and ferritin     5/4/22:  - Overall, tolerating Xarelto   - Patient does describe some migraines, which I think could be related  - We will continue to monitor and they should improve over time   - RTC in 6 months   - We will provide Xarelto prescription for 90 days      1/18/23:  - Overall, doing well on Xarelto. No major issues.  - Discussed we will plan to continue Xarelto as long as she continues to have efficacy without any major toxicities.   - Discussed we will plan for follow-up in 1 year unless any new issues arise.  - RTC in 1 year. No labs needed.      1/18/24:  - She is overall tolerating Xarelto with no complications.   - She dose have a colonoscopy that is coming up on 2/19/24. We will provide instructions to do bridging with Lovenox.   - Given she is higher risk with antiphospholipid syndome, we will have her hold her last dose of Xarelto on 2/15/24. She will start Lovenox 100 mg on 2/16/24 every 12 hours with last dose 2/18/24 in the am. She will not take a pm dose. On the day of colonoscopy no blood thinners in the morning. She will resume 1 dose of Lovenox that evening and then resume Xarelto at regular dose on 2/20/24.   - We discussed again the data that warfarin is a barrier to DOACs and given she is no longer thinking about children and has an IUD in place, we discussed switching to warfarin. She is open to this.   - She will finish her prescription of Xarelto and we will likely think about switching when she is close to needing a new prescription. [facility]. She will call us when she is close. Otherwise, she will return to clinic in 1 year.   - RTC in 1 year.     RTC in 1 year. This note has been transcribed using a medical scribe and there is a possibility of  unintentional typing misprints.      Diagnoses and all orders for this visit:  Antiphospholipid antibody positive  -     enoxaparin (Lovenox) 100 mg/mL syringe; Inject 1 mL (100 mg) under the skin every 12 hours. As described in office: lovenox 100 mg on 2/16/24 every 12 h, last dose 2/18/24 in AM (no PM dose)  - on day of colonoscopy 2/19/24 no blood thinners in AM, resume one dose of lovenox that evening  -     Clinic Appointment Request; Future    Caitlyn Pedroza MD  Hematology/Oncology  [facility]    Scribe Attestation  By signing my name below, I, Anival Oakes attest that this documentation has been prepared under the direction and in the presence of Caitlyn Pedroza MD.

## 2024-01-18 ENCOUNTER — OFFICE VISIT (OUTPATIENT)
Dept: HEMATOLOGY/ONCOLOGY | Facility: CLINIC | Age: 44
End: 2024-01-18
Payer: COMMERCIAL

## 2024-01-18 VITALS
TEMPERATURE: 97.7 F | OXYGEN SATURATION: 97 % | HEART RATE: 72 BPM | RESPIRATION RATE: 16 BRPM | WEIGHT: 226.19 LBS | DIASTOLIC BLOOD PRESSURE: 70 MMHG | BODY MASS INDEX: 36.51 KG/M2 | SYSTOLIC BLOOD PRESSURE: 107 MMHG

## 2024-01-18 DIAGNOSIS — R76.0 ANTIPHOSPHOLIPID ANTIBODY POSITIVE: Primary | ICD-10-CM

## 2024-01-18 PROCEDURE — 1036F TOBACCO NON-USER: CPT | Performed by: INTERNAL MEDICINE

## 2024-01-18 PROCEDURE — 99214 OFFICE O/P EST MOD 30 MIN: CPT | Performed by: INTERNAL MEDICINE

## 2024-01-18 PROCEDURE — 3008F BODY MASS INDEX DOCD: CPT | Performed by: INTERNAL MEDICINE

## 2024-01-18 RX ORDER — ENOXAPARIN SODIUM 100 MG/ML
100 INJECTION SUBCUTANEOUS EVERY 12 HOURS
Qty: 6 EACH | Refills: 0 | Status: SHIPPED | OUTPATIENT
Start: 2024-01-18 | End: 2024-02-01 | Stop reason: SDUPTHER

## 2024-01-18 ASSESSMENT — PAIN SCALES - GENERAL: PAINLEVEL: 0-NO PAIN

## 2024-01-18 NOTE — PATIENT INSTRUCTIONS
- Last dose Xarelto on 2/15/24  - start lovenox 100 mg on 2/16/24 every 12 h, last dose 2/18/24 in AM (no PM dose)  - on day of colonoscopy 2/19/24 no blood thinners in AM, resume one dose of lovenox that evening  - Resume xarelto regular dose on 2/20/24 and stop lovenox

## 2024-01-29 ENCOUNTER — TELEPHONE (OUTPATIENT)
Dept: HEMATOLOGY/ONCOLOGY | Facility: CLINIC | Age: 44
End: 2024-01-29
Payer: COMMERCIAL

## 2024-01-29 NOTE — TELEPHONE ENCOUNTER
VM  Calling about Lovenox prescription and possible change to Warfarin.  Has upcoming colonoscopy 2/19/24 and possible gum surgery.  Checked with pharmacy and they do not have- thought was going to be sent when she saw Dr. Pedroza a few weeks ago.

## 2024-01-29 NOTE — TELEPHONE ENCOUNTER
I spoke with Sakshi about her prescription. I did let her know that it was sent on 01/18 and I did confirm the pharmacy was correct. She did let me know that she will call me back if they need a PA or further information. She also let me know that she will continue Xarelto right now due to upcoming gum surgery. I asked if she needs a refill. She states that she might and will call me back. I updated Dr. Pedroza and she is okay with her continuing the Xarelto. She was appreciative and will call me back if she needs a refill.

## 2024-01-30 ENCOUNTER — TELEPHONE (OUTPATIENT)
Dept: GASTROENTEROLOGY | Facility: CLINIC | Age: 44
End: 2024-01-30
Payer: COMMERCIAL

## 2024-01-30 NOTE — TELEPHONE ENCOUNTER
Colonoscopy scheduled Feb. 19, 2024 with Dr. Coffey; she is on Lovenox (bridge) for Xarelto.  We have the cardiac clearance for the Xarelto; she wants to know the time for the procedure and also if she can have the SuTab instead of Suprep

## 2024-01-31 DIAGNOSIS — Z12.11 COLON CANCER SCREENING: ICD-10-CM

## 2024-01-31 RX ORDER — SOD SULF/POT CHLORIDE/MAG SULF 1.479 G
12 TABLET ORAL EVERY 12 HOURS
Qty: 24 TABLET | Refills: 0 | Status: SHIPPED | OUTPATIENT
Start: 2024-01-31 | End: 2024-04-03 | Stop reason: ALTCHOICE

## 2024-01-31 NOTE — TELEPHONE ENCOUNTER
I left a detailed message on the patient's identifable VM notifying her that the clinic received her message and that Dr. Pedroza is currently out of the office but we are working filling her medications. Call back information was left and it was encouraged that she call with any further questions.

## 2024-01-31 NOTE — TELEPHONE ENCOUNTER
I called the patient's preferred pharmacy at [facility] and they stated that the Lovenox prescription did not come through and that they would need a new order:they also stated that the order for the patient's xarelto was sent in a year ago and that they need an updated order for those refills.

## 2024-01-31 NOTE — TELEPHONE ENCOUNTER
KODY Cantor calling office again regarding Lovenox.  She spoke with [facility] and they stated they have not received the Lovenox prescription- they're telling her it's not in the system at any [facility].    Also, waiting on approval for Xarelto refill.    Please call patient regarding these.

## 2024-02-01 DIAGNOSIS — R76.0 ANTIPHOSPHOLIPID ANTIBODY POSITIVE: Primary | ICD-10-CM

## 2024-02-01 RX ORDER — ENOXAPARIN SODIUM 100 MG/ML
100 INJECTION SUBCUTANEOUS EVERY 12 HOURS
Qty: 6 EACH | Refills: 0 | Status: SHIPPED | OUTPATIENT
Start: 2024-02-01 | End: 2024-02-08 | Stop reason: SDUPTHER

## 2024-02-06 ENCOUNTER — TELEPHONE (OUTPATIENT)
Dept: GASTROENTEROLOGY | Facility: CLINIC | Age: 44
End: 2024-02-06
Payer: COMMERCIAL

## 2024-02-06 NOTE — TELEPHONE ENCOUNTER
Left message for patient re: Xarelto 2 days prior to procedure. I am not sure about the lovenox bridge.

## 2024-02-08 DIAGNOSIS — R76.0 ANTIPHOSPHOLIPID ANTIBODY POSITIVE: ICD-10-CM

## 2024-02-08 RX ORDER — ENOXAPARIN SODIUM 100 MG/ML
100 INJECTION SUBCUTANEOUS EVERY 12 HOURS
Qty: 6 EACH | Refills: 0 | Status: SHIPPED | OUTPATIENT
Start: 2024-02-08 | End: 2024-04-03 | Stop reason: ALTCHOICE

## 2024-02-08 NOTE — TELEPHONE ENCOUNTER
This  advised patient that Lovenox prescription was sent in and that this office will call pharmacy direct to discuss and let patient know when everything has been straightened out.  Patient aware of plan and appreciative.

## 2024-02-08 NOTE — TELEPHONE ENCOUNTER
I spoke with Sakshi and notified her that I talked with her [facility] pharmacy in [place] and they did say that they haven't received the electronic order for the Lovenox even though it was sent by Dr. Pedroza. I notified Sakshi that her script for the Lovenox was faxed via paper order at 4:50 PM today (2/8/24) and to give the [facility] pharmacy a call tomorrow around noon to verify they can fill her medication. She gave verbal understanding, was appreciative and had no questions at this time.    A fax success confirmation was noted at 4:55 PM on 2/8/24

## 2024-02-08 NOTE — TELEPHONE ENCOUNTER
Patient called [facility] in [place]- they are saying they do not have the Lovenox prescription.  Patient has the procedure on 2/19 and is supposed to start Lovenox on the 15th.

## 2024-02-19 ENCOUNTER — HOSPITAL ENCOUNTER (OUTPATIENT)
Dept: GASTROENTEROLOGY | Facility: HOSPITAL | Age: 44
Setting detail: OUTPATIENT SURGERY
Discharge: HOME | End: 2024-02-19
Payer: COMMERCIAL

## 2024-02-19 ENCOUNTER — ANESTHESIA EVENT (OUTPATIENT)
Dept: GASTROENTEROLOGY | Facility: HOSPITAL | Age: 44
End: 2024-02-19
Payer: COMMERCIAL

## 2024-02-19 ENCOUNTER — ANESTHESIA (OUTPATIENT)
Dept: GASTROENTEROLOGY | Facility: HOSPITAL | Age: 44
End: 2024-02-19
Payer: COMMERCIAL

## 2024-02-19 VITALS
HEART RATE: 77 BPM | HEIGHT: 66 IN | WEIGHT: 225 LBS | TEMPERATURE: 96.8 F | OXYGEN SATURATION: 100 % | DIASTOLIC BLOOD PRESSURE: 79 MMHG | RESPIRATION RATE: 16 BRPM | BODY MASS INDEX: 36.16 KG/M2 | SYSTOLIC BLOOD PRESSURE: 122 MMHG

## 2024-02-19 DIAGNOSIS — Z12.11 SCREEN FOR COLON CANCER: ICD-10-CM

## 2024-02-19 LAB — HCG UR QL IA.RAPID: NEGATIVE

## 2024-02-19 PROCEDURE — 7100000010 HC PHASE TWO TIME - EACH INCREMENTAL 1 MINUTE

## 2024-02-19 PROCEDURE — 7100000009 HC PHASE TWO TIME - INITIAL BASE CHARGE

## 2024-02-19 PROCEDURE — 81025 URINE PREGNANCY TEST: CPT | Performed by: INTERNAL MEDICINE

## 2024-02-19 PROCEDURE — 3700000001 HC GENERAL ANESTHESIA TIME - INITIAL BASE CHARGE

## 2024-02-19 PROCEDURE — 45385 COLONOSCOPY W/LESION REMOVAL: CPT | Performed by: INTERNAL MEDICINE

## 2024-02-19 PROCEDURE — 0753T DGTZ GLS MCRSCP SLD LEVEL IV: CPT | Mod: TC,SUR,STJLAB | Performed by: INTERNAL MEDICINE

## 2024-02-19 PROCEDURE — A45385 PR COLONOSCOPY,REMV LESN,SNARE: Performed by: STUDENT IN AN ORGANIZED HEALTH CARE EDUCATION/TRAINING PROGRAM

## 2024-02-19 PROCEDURE — 3700000002 HC GENERAL ANESTHESIA TIME - EACH INCREMENTAL 1 MINUTE

## 2024-02-19 PROCEDURE — A45385 PR COLONOSCOPY,REMV LESN,SNARE: Performed by: ANESTHESIOLOGIST ASSISTANT

## 2024-02-19 PROCEDURE — 2500000004 HC RX 250 GENERAL PHARMACY W/ HCPCS (ALT 636 FOR OP/ED): Performed by: ANESTHESIOLOGIST ASSISTANT

## 2024-02-19 PROCEDURE — 88305 TISSUE EXAM BY PATHOLOGIST: CPT | Performed by: PATHOLOGY

## 2024-02-19 RX ORDER — PROPOFOL 10 MG/ML
INJECTION, EMULSION INTRAVENOUS CONTINUOUS PRN
Status: DISCONTINUED | OUTPATIENT
Start: 2024-02-19 | End: 2024-02-19

## 2024-02-19 RX ORDER — FENTANYL CITRATE 50 UG/ML
25 INJECTION, SOLUTION INTRAMUSCULAR; INTRAVENOUS EVERY 5 MIN PRN
OUTPATIENT
Start: 2024-02-19

## 2024-02-19 RX ORDER — LABETALOL HYDROCHLORIDE 5 MG/ML
5 INJECTION, SOLUTION INTRAVENOUS ONCE AS NEEDED
OUTPATIENT
Start: 2024-02-19

## 2024-02-19 RX ORDER — ONDANSETRON HYDROCHLORIDE 2 MG/ML
4 INJECTION, SOLUTION INTRAVENOUS ONCE AS NEEDED
OUTPATIENT
Start: 2024-02-19

## 2024-02-19 RX ORDER — MIDAZOLAM HYDROCHLORIDE 1 MG/ML
1 INJECTION, SOLUTION INTRAMUSCULAR; INTRAVENOUS ONCE AS NEEDED
OUTPATIENT
Start: 2024-02-19

## 2024-02-19 RX ORDER — OXYCODONE HYDROCHLORIDE 5 MG/1
5 TABLET ORAL EVERY 4 HOURS PRN
OUTPATIENT
Start: 2024-02-19

## 2024-02-19 RX ORDER — ALBUTEROL SULFATE 0.83 MG/ML
2.5 SOLUTION RESPIRATORY (INHALATION) ONCE AS NEEDED
OUTPATIENT
Start: 2024-02-19

## 2024-02-19 RX ORDER — LIDOCAINE HYDROCHLORIDE 10 MG/ML
0.1 INJECTION INFILTRATION; PERINEURAL ONCE
OUTPATIENT
Start: 2024-02-19 | End: 2024-02-19

## 2024-02-19 RX ORDER — SODIUM CHLORIDE, SODIUM LACTATE, POTASSIUM CHLORIDE, CALCIUM CHLORIDE 600; 310; 30; 20 MG/100ML; MG/100ML; MG/100ML; MG/100ML
100 INJECTION, SOLUTION INTRAVENOUS CONTINUOUS
OUTPATIENT
Start: 2024-02-19

## 2024-02-19 RX ORDER — MEPERIDINE HYDROCHLORIDE 50 MG/ML
12.5 INJECTION INTRAMUSCULAR; INTRAVENOUS; SUBCUTANEOUS EVERY 10 MIN PRN
OUTPATIENT
Start: 2024-02-19

## 2024-02-19 RX ADMIN — PROPOFOL 300 MCG/KG/MIN: 10 INJECTION, EMULSION INTRAVENOUS at 15:12

## 2024-02-19 SDOH — HEALTH STABILITY: MENTAL HEALTH: CURRENT SMOKER: 0

## 2024-02-19 ASSESSMENT — PAIN SCALES - GENERAL
PAINLEVEL_OUTOF10: 0 - NO PAIN
PAINLEVEL_OUTOF10: 0 - NO PAIN
PAIN_LEVEL: 0
PAINLEVEL_OUTOF10: 0 - NO PAIN

## 2024-02-19 ASSESSMENT — COLUMBIA-SUICIDE SEVERITY RATING SCALE - C-SSRS
1. IN THE PAST MONTH, HAVE YOU WISHED YOU WERE DEAD OR WISHED YOU COULD GO TO SLEEP AND NOT WAKE UP?: NO
2. HAVE YOU ACTUALLY HAD ANY THOUGHTS OF KILLING YOURSELF?: NO
6. HAVE YOU EVER DONE ANYTHING, STARTED TO DO ANYTHING, OR PREPARED TO DO ANYTHING TO END YOUR LIFE?: NO

## 2024-02-19 ASSESSMENT — PAIN - FUNCTIONAL ASSESSMENT
PAIN_FUNCTIONAL_ASSESSMENT: 0-10
PAIN_FUNCTIONAL_ASSESSMENT: 0-10

## 2024-02-19 NOTE — ANESTHESIA PREPROCEDURE EVALUATION
Patient: Sakshi Kahn    Procedure Information       Date/Time: 02/19/24 1450    Scheduled providers: Hannah HUSSEIN MD    Procedure: COLONOSCOPY    Location: [facility]            Relevant Problems   Cardiovascular   (+) Pure hypercholesterolemia      Endocrine   (+) Class 2 severe obesity due to excess calories with serious comorbidity and body mass index (BMI) of 36.0 to 36.9 in adult (CMS/HCC)      /Renal   (+) Liver cyst      Neuro/Psych   (+) Anxiety   (+) Depression      Hematology   (+) Anemia   (+) Antiphospholipid syndrome complicating pregnancy, antepartum (CMS/HCC)      Infectious Disease   (+) COVID-19       Clinical information reviewed:    Allergies  Meds   Med Hx  Surg Hx  OB Status  Fam Hx  Soc Hx        NPO Detail:  NPO/Void Status  Carbohydrate Drink Given Prior to Surgery? : N  Date of Last Liquid: 02/19/24  Time of Last Liquid: 1330  Date of Last Solid: 02/18/24  Time of Last Solid: 0800  Last Intake Type: Clear fluids  Time of Last Void: 1300         Physical Exam    Airway  Mallampati: II     Cardiovascular - normal exam  Rhythm: regular  Rate: normal     Dental - normal exam     Pulmonary - normal exam     Abdominal - normal exam  Abdomen: soft           Anesthesia Plan    History of general anesthesia?: yes  History of complications of general anesthesia?: no    ASA 2     MAC     The patient is not a current smoker.  Patient was previously instructed to abstain from smoking on day of procedure.  Patient did not smoke on day of procedure.  Education provided regarding risk of obstructive sleep apnea.  intravenous induction   Postoperative administration of opioids is intended.  Anesthetic plan and risks discussed with patient.  Use of blood products discussed with patient who.    Plan discussed with [facility]AA.

## 2024-02-19 NOTE — ANESTHESIA POSTPROCEDURE EVALUATION
Patient: Sakshi Kahn    Procedure Summary       Date: 02/19/24 Room / Location: [facility]    Anesthesia Start: 1508 Anesthesia Stop:     Procedure: COLONOSCOPY Diagnosis: Screen for colon cancer    Scheduled Providers: Hannah HUSSEIN MD Responsible Provider: Alexx Baker DO    Anesthesia Type: MAC ASA Status: 2            Anesthesia Type: MAC    Vitals Value Taken Time   /74 02/19/24 1545   Temp 36 02/19/24 1545   Pulse 72 02/19/24 1545   Resp 13 02/19/24 1545   SpO2 100 02/19/24 1545       Anesthesia Post Evaluation    Patient location during evaluation: PACU  Patient participation: complete - patient cannot participate  Level of consciousness: awake and alert  Pain score: 0  Pain management: adequate  There was medical reason for not using a multimodal analgesia pain management approach.Airway patency: patent  Two or more strategies used to mitigate risk of obstructive sleep apnea  Cardiovascular status: acceptable and stable  Respiratory status: acceptable and room air  Hydration status: acceptable  Postoperative Nausea and Vomiting: none        No notable events documented.

## 2024-02-19 NOTE — DISCHARGE INSTRUCTIONS
Patient Instructions Post Endoscopy Procedure      The anesthetics, sedatives or narcotics which were given to you today will be acting in your body for the next 24 hours, so you might feel a little sleepy or groggy.  This feeling should slowly wear off. Carefully read and follow the instructions.     You received sedation today:  - Do not drive or operate any machinery or power tools of any kind.   - No alcoholic beverages today, not even beer or wine.  - Do not make any important decisions or sign any legal documents.  - No over the counter medications that contain alcohol or that may cause drowsiness.    While it is common to experience mild to moderate abdominal distention, gas, or belching after your procedure, if any of these symptoms occur following discharge from the GI Lab or within one week of having your procedure, call the [facility] to be advised whether a visit to your nearest Urgent Care or Emergency Department is indicated.  Take this paper with you if you go.   - If you develop an allergic reaction to the medications that were given during your procedure such as difficulty breathing, rash, hives, severe nausea, vomiting or lightheadedness.  - If you experience chest pain, shortness of breath, severe abdominal pain, fevers and chills.  -If you develop signs and symptoms of bleeding such as blood in your spit, if your stools turn black, tarry, or bloody  - If you have not urinated within 8 hours following your procedure.  - If your IV site becomes painful, red, inflamed, or looks infected.    If you received a biopsy/polypectomy the following instructions apply below:  _x_ Do not use non-steroidal medications or anti-coagulants for one week following your procedure. (Examples of these types of medications are: Advil, Arthrotec, Aleve, Coumadin, Ecotrin, Heparin, Ibuprofen, Indocin, Motrin, Naprosyn, Nuprin, Plavix, Vioxx, and Voltarin, or their generic forms.  This list is not  all-inclusive.  Check with your physician or pharmacist before resuming medications.)   _x_ Eat a soft diet today.  Avoid foods that are poorly digested for the next 24 hours.  These foods would include: nuts, beans, lettuce, red meats, and fried foods. Start with liquids and advance your diet as tolerated, gradually work up to eating solids.   __ You can restart your ASA tomorrow  _x_ You can resume your anticoagulation therapy -Rivaroxaban  in 2 days    Your physician recommends the additional following instructions:    -You have a contact number available for emergencies. The signs and symptoms of potential delayed complications were discussed with you. You may return to normal activities tomorrow.  -Resume your previous diet or other if specified.  -Continue your present medications.   -We are waiting for your pathology results, if applicable. The results will be available in [facility]. I will send you a message with any recommendations.  -The findings and recommendations have been discussed with you and/or family.  -Please see Medication Reconciliation Form for new medication/medications prescribed.     In the event of an emergency please go to the closest Emergency Department or call Dr. Coffey at [phone]

## 2024-02-19 NOTE — H&P
Outpatient Hospital Procedure    Patient Profile-Procedures  Initial Info  Patient Demographics  Name Sakshi Kahn  Date of Birth 1980  MRN [id number]  Address   [address] [facility] [id number] [facility] [address]    Primary Phone Number [phone]  Secondary Phone Number    PCP Heike Gonzalez    Procedures   Colonoscopy      Indication:  Screening - avg risk    Primary contact name and number   Extended Emergency Contact Information  Primary Emergency Contact: Haider Kahn  Home Phone: [phone]  Relation: Spouse    General Health  Weight   Vitals:    02/19/24 1406   Weight: 102 kg (225 lb)     BMI Body mass index is 36.32 kg/m².    Allergies  Allergies   Allergen Reactions    Aspirin Other    Ibuprofen Bleeding       Past Medical History   Past Medical History:   Diagnosis Date    Other specified health status     No pertinent past medical history    Personal history of other infectious and parasitic diseases 02/18/2020    History of gonorrhea    Personal history of other venous thrombosis and embolism 02/18/2020    History of deep venous thrombosis       Provider assessment  Diagnosis  Medication Reviewed - yes  Prior to Admission medications    Medication Sig Start Date End Date Taking? Authorizing Provider   cholecalciferol (Vitamin D-3) 25 MCG (1000 UT) capsule Take 1 capsule (25 mcg) by mouth once daily.   Yes Historical Provider, MD   cholecalciferol (Vitamin D-3) 50 mcg (2,000 unit) capsule Take 1 capsule (50 mcg) by mouth once daily. 1/25/23  Yes Historical Provider, MD   docosahexaenoic acid/epa (FISH OIL ORAL) Take 1 capsule by mouth in the morning.   Yes Historical Provider, MD   enoxaparin (Lovenox) 100 mg/mL syringe Inject 1 mL (100 mg) under the skin every 12 hours. As described in office: lovenox 100 mg on 2/16/24 every 12 h, last dose 2/18/24 in AM (no PM dose)  - on day of colonoscopy 2/19/24 no blood thinners in AM, resume one dose of  lovenox that evening 2/8/24  Yes JUANA Oakley-CNP   levonorgestrel (Mirena) 21 mcg/24 hours (8 yrs) 52 mg IUD 52 mg by intrauterine route 1 time.   Yes Historical Provider, MD   magnesium oxide (Mag-Ox) 400 mg tablet Take 1 tablet (400 mg) by mouth once daily.   Yes Historical Provider, MD   naltrexone-bupropion (Contrave) 8-90 mg ER tablet 1 tablet qAM days 1-7, then 1 tab BID days 8-14, then 2 tabs in AM + 1 tab in PM days 15-22, then 2 tabs BID. 1/10/24  Yes JUANA Clifton-CNP   rivaroxaban (Xarelto) 20 mg tablet Take 1 tablet (20 mg) by mouth once daily. Take with food. 2/1/24  Yes Caitlyn Pedroza MD   sod sulf-pot chloride-mag sulf (Sutab) 1.479-0.188- 0.225 gram tablet Take 12 tablets by mouth every 12 hours. 1/31/24  Yes Hannah HUSSEIN MD   sodium,potassium,mag sulfates (Suprep Bowel Prep Kit) 17.5-3.13-1.6 gram recon soln solution Take 1 bottle by mouth every 12 hours. 1/15/24  Yes Hannah HUSSEIN MD   ascorbic acid (Vitamin C) 500 mg tablet Take 1 tablet (500 mg) by mouth once daily.    Historical Provider, MD   chorionic gonadotropin (Pregnyl) 10,000 unit injection Inject into the muscle. 11/18/19   Historical Provider, MD   clomiPHENE (Clomid) 50 mg tablet Take by mouth. 6/13/19   Historical Provider, MD   ergocalciferol (Vitamin D-2) 1.25 MG (12822 UT) capsule Take by mouth. 11/27/19   Historical Provider, MD   fluticasone (Flonase) 50 mcg/actuation nasal spray 1 spray by Does not apply route. 7/5/18   Historical Provider, MD   follitropin beta (Follistim AQ) 600 unit/0.72 mL injection Inject 650 Units under the skin. 1/2/20   Historical Provider, MD   ganirelix (Orgalutran) 250 mcg/0.5 mL syringe injection Inject under the skin. 1/2/20   Historical Provider, MD   letrozole (Femara) 2.5 mg tablet Take by mouth. 11/14/19   Historical Provider, MD   leuprolide (Lupron) 1 mg/0.2 mL injection Leuprolide Acetate 1 MG/0.2ML Injection Kit USE AS DIRECTED. Quantity: 1 Refills: 0 Adia ROCK,  Fern Start : 2-Jan-2020 Active 1/2/20   Historical Provider, MD   menotropins (Menopur) 75 unit recon soln injection Inject under the skin. 1/2/20   Historical Provider, MD   orphenadrine (Norflex) 100 mg 12 hr tablet Take 1 tablet (100 mg) by mouth 2 times a day. 1/25/23   Historical Provider, MD   -iron fum-folic acid 29 mg iron- 1 mg tablet Take by mouth once daily. 3/3/22   Historical Provider, MD   progesterone (Prometrium) 200 mg capsule Take by mouth. 7/12/19   Historical Provider, MD   venlafaxine XR (Effexor-XR) 37.5 mg 24 hr capsule Take 1 capsule (37.5 mg) by mouth once daily. 7/5/18   Historical Provider, MD       This is my H&P    Physical Exam  Physical Exam  Constitutional:       Comments: Awake   HENT:      Head: Normocephalic.   Cardiovascular:      Rate and Rhythm: Normal rate and regular rhythm.   Pulmonary:      Effort: Pulmonary effort is normal.      Breath sounds: Normal breath sounds.   Abdominal:      General: Bowel sounds are normal.      Palpations: Abdomen is soft.   Neurological:      Mental Status: She is alert.   Psychiatric:         Mood and Affect: Mood normal.           Oropharyngeal Classification II (hard and soft palate, upper portion of tonsils anduvula visible)  ASA PS Classification 3  Sedation Plan Deep  Procedure Plan - pre-procedural (re)assesment completed by physician:  discharge/transfer patient when discharge criteria met    Hannah Coffey MD  2/19/2024 2:40 PM

## 2024-02-23 LAB
LABORATORY COMMENT REPORT: NORMAL
PATH REPORT.FINAL DX SPEC: NORMAL
PATH REPORT.GROSS SPEC: NORMAL
PATH REPORT.RELEVANT HX SPEC: NORMAL
PATH REPORT.TOTAL CANCER: NORMAL

## 2024-03-26 ENCOUNTER — TELEPHONE (OUTPATIENT)
Dept: PRIMARY CARE | Facility: CLINIC | Age: 44
End: 2024-03-26
Payer: COMMERCIAL

## 2024-03-26 LAB
NON-UH HIE CALCULATED LDL CHOLESTEROL: 128 MG/DL (ref 60–130)
NON-UH HIE CHOLESTEROL: 196 MG/DL (ref 100–200)
NON-UH HIE HDL CHOLESTEROL: 50 MG/DL (ref 40–60)
NON-UH HIE TOTAL CHOL/HDL CHOL RATIO: 3.9
NON-UH HIE TRIGLYCERIDES: 89 MG/DL (ref 30–150)

## 2024-03-26 NOTE — TELEPHONE ENCOUNTER
----- Message from PELON Clifton sent at 3/26/2024 12:52 PM EDT -----  Trigs and hdl normal.  Ldl high at 128. Goal <100. Decr fats and incr fiber.  Epic would not let me me send a mychart message.

## 2024-04-01 ASSESSMENT — PROMIS GLOBAL HEALTH SCALE
RATE_AVERAGE_FATIGUE: MILD
CARRYOUT_PHYSICAL_ACTIVITIES: COMPLETELY
RATE_AVERAGE_PAIN: 3
RATE_MENTAL_HEALTH: VERY GOOD
RATE_GENERAL_HEALTH: VERY GOOD
CARRYOUT_SOCIAL_ACTIVITIES: VERY GOOD
EMOTIONAL_PROBLEMS: NEVER
RATE_QUALITY_OF_LIFE: VERY GOOD
RATE_PHYSICAL_HEALTH: VERY GOOD
RATE_SOCIAL_SATISFACTION: VERY GOOD

## 2024-04-03 ENCOUNTER — APPOINTMENT (OUTPATIENT)
Dept: PRIMARY CARE | Facility: CLINIC | Age: 44
End: 2024-04-03
Payer: COMMERCIAL

## 2024-04-03 PROBLEM — M79.662 BILATERAL CALF PAIN: Status: RESOLVED | Noted: 2024-01-15 | Resolved: 2024-04-03

## 2024-04-03 PROBLEM — M79.661 BILATERAL CALF PAIN: Status: RESOLVED | Noted: 2024-01-15 | Resolved: 2024-04-03

## 2024-04-03 PROBLEM — O99.119: Status: RESOLVED | Noted: 2024-01-15 | Resolved: 2024-04-03

## 2024-04-03 PROBLEM — R23.2 FLUSHING: Status: RESOLVED | Noted: 2024-01-15 | Resolved: 2024-04-03

## 2024-04-03 PROBLEM — D68.61: Status: RESOLVED | Noted: 2024-01-15 | Resolved: 2024-04-03

## 2024-04-03 PROBLEM — Z86.59 HISTORY OF DEPRESSION: Status: RESOLVED | Noted: 2024-01-15 | Resolved: 2024-04-03

## 2024-04-03 PROBLEM — M72.2 PLANTAR FASCIITIS, LEFT: Status: RESOLVED | Noted: 2023-01-18 | Resolved: 2024-04-03

## 2024-04-03 PROBLEM — G89.18 POSTOPERATIVE PAIN: Status: RESOLVED | Noted: 2024-01-15 | Resolved: 2024-04-03

## 2024-04-03 PROBLEM — R10.9 RIGHT FLANK PAIN: Status: RESOLVED | Noted: 2023-01-31 | Resolved: 2024-04-03

## 2024-04-03 PROBLEM — U07.1 COVID-19: Status: RESOLVED | Noted: 2023-01-18 | Resolved: 2024-04-03

## 2024-04-03 PROBLEM — M79.89 SWELLING OF LOWER EXTREMITY: Status: RESOLVED | Noted: 2024-01-15 | Resolved: 2024-04-03

## 2024-04-03 PROBLEM — R31.9 HEMATURIA: Status: RESOLVED | Noted: 2024-01-15 | Resolved: 2024-04-03

## 2024-04-03 PROBLEM — M54.40 ACUTE BACK PAIN WITH SCIATICA: Status: RESOLVED | Noted: 2024-01-15 | Resolved: 2024-04-03

## 2024-04-03 PROBLEM — O36.80X0 EXAMINATION TO DETERMINE FETAL VIABILITY OF PREGNANCY (HHS-HCC): Status: RESOLVED | Noted: 2023-01-18 | Resolved: 2024-04-03

## 2024-04-03 PROBLEM — N97.9 FEMALE INFERTILITY: Status: RESOLVED | Noted: 2023-01-18 | Resolved: 2024-04-03

## 2024-04-03 ASSESSMENT — ENCOUNTER SYMPTOMS
PALPITATIONS: 0
FREQUENCY: 0
NECK PAIN: 0
TROUBLE SWALLOWING: 0
CONFUSION: 0
DYSURIA: 0
HALLUCINATIONS: 0
CHILLS: 0
FATIGUE: 0
BLOOD IN STOOL: 0
SORE THROAT: 0
BRUISES/BLEEDS EASILY: 0
POLYPHAGIA: 0
VOMITING: 0
HEMATURIA: 0
EYE PAIN: 0
HEADACHES: 0
FEVER: 0
ABDOMINAL PAIN: 0
SHORTNESS OF BREATH: 0
WOUND: 0
BACK PAIN: 0
APPETITE CHANGE: 0
ADENOPATHY: 0
COUGH: 0
UNEXPECTED WEIGHT CHANGE: 0
POLYDIPSIA: 0
EYE REDNESS: 0
EYE DISCHARGE: 0
DIZZINESS: 0

## 2024-04-03 NOTE — PROGRESS NOTES
Subjective   Patient ID: Sakshi Kahn is a 43 y.o. female who presents for Annual Exam.    Last labs:3/2024 ldl 128  12/2023 Trigs and hdl normal.  Ldl high at 133. Goal <100. Decr fats and incr fiber. Last time it was 123. We can discuss this more at your appt.  Sugar (aka glucose), kidney function, liver function and electrolytes in the CMP (comprehensive metabolic panel) were normal.  TSH (thyroid test) was normal.  CBC (complete blood count) was normal which looks at infection and anemia markers.  1/2023 cmp nl, tsh nl, lipid ldl 123, vit d low at 27  Due to check vit d    Any other providers besides below:  hui Cunningham, neno, ezequiel/janet whaley  Which vitamin d is pt taking in her med list? 2000IU   Looks like pt had mri liver 5/2023 which showed a probable benign liver cyst and dr rizvi had ordered an ultrasound of the liver to follow up in which was done in aug. Did pt need any further testing? Not that she's aware of   Last time pt had a migraine? Been a long time. Poss 1 in the last year.   Any concerns pt wants to discuss today? No     Pls review surgeries with pt- done     Phq9=  0,  gad7=0    HPI    Immunization History   Administered Date(s) Administered    Flu vaccine (IIV4), preservative free *Check age/dose* 12/10/2020    Influenza, seasonal, injectable 10/13/2021    Moderna SARS-CoV-2 Vaccination 11/18/2021    Pfizer Purple Cap SARS-CoV-2 03/21/2021, 04/11/2021    Pneumococcal Conjugate PCV 7 1980    Tdap vaccine, age 7 year and older (BOOSTRIX, ADACEL) 06/12/2015, 03/31/2016, 10/13/2021      Other concerns: has sleep study scheduled for 4/13/24 due to snoring and daytime sleepiness      ER/urgicare visits in the last year- dec 2-3 times-sinus inf-2 abx  Hospitalizations in the last year- none      last Pap-dr cunningham-appt in june  H/o abn pap-none    FH ovarian, endometrial, cervical, uterine ca-none    Current birth control method-mirena  No change in contraception  desired    last mammo- (40-75/90)- br MRI 5/2022; 7/20/23  Self breast exams-yes    FH br ca-none    FH colon ca-none  Last colonoscopy 2/19/24; due 2/2029      Exercise- 2 am use  free apps; more steps-getting 7000 at least 5d a wk; work FT and has a toddler  Diet-3 meals -afternoon snack    last eye dr appt- contacts -9/2023  No vision issues    last dental appt- [facility]-thin gums-at some point grafting      BMs-regular  Sleep-able to fall asleep and stay asleep; pos snoring but no apnea; daytime sleepiness noted  no cp, swelling, sob, abd pain, n/v/d/c, blood in stool or black stools  STI testing including hiv (age 15-65) and hep c screening (18-79)-declines      fractures in lifetime-rt wrist, toes, rt arm  Fh osteoporosis-none    FH heart attack, heart surgery-mom-afib  FH stroke-mgm, cousin    Alcohol -how many drinks per wk? 2-3 a wk    No care team member to display     Review of Systems   Constitutional:  Negative for appetite change, chills, fatigue, fever and unexpected weight change.   HENT:  Negative for congestion, ear pain, sore throat and trouble swallowing.    Eyes:  Negative for pain, discharge and redness.   Respiratory:  Negative for cough and shortness of breath.    Cardiovascular:  Negative for chest pain and palpitations.   Gastrointestinal:  Negative for abdominal pain, blood in stool and vomiting.   Endocrine: Negative for polydipsia, polyphagia and polyuria.   Genitourinary:  Negative for dysuria, frequency, hematuria and urgency.   Musculoskeletal:  Negative for back pain and neck pain.   Skin:  Negative for rash and wound.   Allergic/Immunologic: Negative for immunocompromised state.   Neurological:  Negative for dizziness, syncope and headaches.   Hematological:  Negative for adenopathy. Does not bruise/bleed easily.   Psychiatric/Behavioral:  Negative for confusion and hallucinations.        Objective   Visit Vitals  /74   Pulse 78   Temp 36.2 °C (97.1 °F)      BP Readings from Last 3  "Encounters:   04/04/24 114/74   02/19/24 122/79   01/18/24 107/70     Wt Readings from Last 3 Encounters:   04/04/24 104 kg (229 lb)   02/19/24 102 kg (225 lb)   01/18/24 103 kg (226 lb 3.1 oz)     No results found for: \"CHOL\"  No results found for: \"HDL\"  No results found for: \"LDLCALC\"  No results found for: \"TRIG\"  No components found for: \"CHOLHDL\"  No results found for: \"HGBA1C\"   No results found for: \"ALBUR\", \"WWT41XDV\"   The ASCVD Risk score (Shanika DK, et al., 2019) failed to calculate for the following reasons:    Cannot find a previous HDL lab    Cannot find a previous total cholesterol lab     Physical Exam  Constitutional:       General: She is not in acute distress.     Appearance: Normal appearance. She is not ill-appearing.   HENT:      Head: Normocephalic.      Right Ear: Tympanic membrane, ear canal and external ear normal.      Left Ear: Tympanic membrane, ear canal and external ear normal.      Nose: Nose normal.      Mouth/Throat:      Mouth: Mucous membranes are moist.      Pharynx: Oropharynx is clear.   Eyes:      Extraocular Movements: Extraocular movements intact.      Conjunctiva/sclera: Conjunctivae normal.      Pupils: Pupils are equal, round, and reactive to light.   Cardiovascular:      Rate and Rhythm: Normal rate and regular rhythm.      Heart sounds: Normal heart sounds. No murmur heard.  Pulmonary:      Effort: Pulmonary effort is normal. No respiratory distress.      Breath sounds: Normal breath sounds. No wheezing, rhonchi or rales.   Abdominal:      General: Bowel sounds are normal.      Palpations: Abdomen is soft. There is no mass.      Tenderness: There is no abdominal tenderness.   Musculoskeletal:         General: No swelling or tenderness. Normal range of motion.      Cervical back: Normal range of motion and neck supple.      Right lower leg: No edema.      Left lower leg: No edema.   Skin:     General: Skin is warm.      Findings: No rash.   Neurological:      General: " No focal deficit present.      Mental Status: She is alert and oriented to person, place, and time.      Cranial Nerves: No cranial nerve deficit.      Motor: No weakness.   Psychiatric:         Mood and Affect: Mood normal.         Behavior: Behavior normal.         Assessment/Plan   1. Routine general medical examination at a health care facility      2. Vitamin D deficiency    - Vitamin D 25-Hydroxy,Total (for eval of Vitamin D levels); Future    3. BMI 36.0-36.9,adult      4. Class 2 severe obesity due to excess calories with serious comorbidity and body mass index (BMI) of 36.0 to 36.9 in adult (CMS/[facility])

## 2024-04-04 ENCOUNTER — OFFICE VISIT (OUTPATIENT)
Dept: PRIMARY CARE | Facility: CLINIC | Age: 44
End: 2024-04-04
Payer: COMMERCIAL

## 2024-04-04 VITALS
OXYGEN SATURATION: 95 % | BODY MASS INDEX: 36.8 KG/M2 | SYSTOLIC BLOOD PRESSURE: 114 MMHG | HEIGHT: 66 IN | HEART RATE: 78 BPM | TEMPERATURE: 97.1 F | DIASTOLIC BLOOD PRESSURE: 74 MMHG | WEIGHT: 229 LBS

## 2024-04-04 DIAGNOSIS — E66.01 CLASS 2 SEVERE OBESITY DUE TO EXCESS CALORIES WITH SERIOUS COMORBIDITY AND BODY MASS INDEX (BMI) OF 36.0 TO 36.9 IN ADULT (MULTI): ICD-10-CM

## 2024-04-04 DIAGNOSIS — Z00.00 ROUTINE GENERAL MEDICAL EXAMINATION AT A HEALTH CARE FACILITY: Primary | ICD-10-CM

## 2024-04-04 DIAGNOSIS — E55.9 VITAMIN D DEFICIENCY: ICD-10-CM

## 2024-04-04 PROCEDURE — 3008F BODY MASS INDEX DOCD: CPT | Performed by: NURSE PRACTITIONER

## 2024-04-04 PROCEDURE — 99396 PREV VISIT EST AGE 40-64: CPT | Performed by: NURSE PRACTITIONER

## 2024-04-04 PROCEDURE — 1036F TOBACCO NON-USER: CPT | Performed by: NURSE PRACTITIONER

## 2024-04-04 RX ORDER — BISMUTH SUBSALICYLATE 262 MG
1 TABLET,CHEWABLE ORAL DAILY
COMMUNITY

## 2024-04-04 ASSESSMENT — PATIENT HEALTH QUESTIONNAIRE - PHQ9
SUM OF ALL RESPONSES TO PHQ QUESTIONS 1-9: 0
4. FEELING TIRED OR HAVING LITTLE ENERGY: NOT AT ALL
6. FEELING BAD ABOUT YOURSELF - OR THAT YOU ARE A FAILURE OR HAVE LET YOURSELF OR YOUR FAMILY DOWN: NOT AT ALL
9. THOUGHTS THAT YOU WOULD BE BETTER OFF DEAD, OR OF HURTING YOURSELF: NOT AT ALL
7. TROUBLE CONCENTRATING ON THINGS, SUCH AS READING THE NEWSPAPER OR WATCHING TELEVISION: NOT AT ALL
3. TROUBLE FALLING OR STAYING ASLEEP OR SLEEPING TOO MUCH: NOT AT ALL
8. MOVING OR SPEAKING SO SLOWLY THAT OTHER PEOPLE COULD HAVE NOTICED. OR THE OPPOSITE, BEING SO FIGETY OR RESTLESS THAT YOU HAVE BEEN MOVING AROUND A LOT MORE THAN USUAL: NOT AT ALL
2. FEELING DOWN, DEPRESSED OR HOPELESS: NOT AT ALL
SUM OF ALL RESPONSES TO PHQ9 QUESTIONS 1 AND 2: 0
5. POOR APPETITE OR OVEREATING: NOT AT ALL
1. LITTLE INTEREST OR PLEASURE IN DOING THINGS: NOT AT ALL

## 2024-04-04 ASSESSMENT — ANXIETY QUESTIONNAIRES
IF YOU CHECKED OFF ANY PROBLEMS ON THIS QUESTIONNAIRE, HOW DIFFICULT HAVE THESE PROBLEMS MADE IT FOR YOU TO DO YOUR WORK, TAKE CARE OF THINGS AT HOME, OR GET ALONG WITH OTHER PEOPLE: NOT DIFFICULT AT ALL
GAD7 TOTAL SCORE: 0
3. WORRYING TOO MUCH ABOUT DIFFERENT THINGS: NOT AT ALL
2. NOT BEING ABLE TO STOP OR CONTROL WORRYING: NOT AT ALL
1. FEELING NERVOUS, ANXIOUS, OR ON EDGE: NOT AT ALL
7. FEELING AFRAID AS IF SOMETHING AWFUL MIGHT HAPPEN: NOT AT ALL
6. BECOMING EASILY ANNOYED OR IRRITABLE: NOT AT ALL
5. BEING SO RESTLESS THAT IT IS HARD TO SIT STILL: NOT AT ALL
4. TROUBLE RELAXING: NOT AT ALL

## 2024-04-04 NOTE — PATIENT INSTRUCTIONS
Handouts given to pt:  physical handout        Labs:    You can use the lab in our building when fasting. The hrs are: Monday-Friday, 7 a.m. - 5 p.m., Saturday 8 a.m. - 12 noon.   No appt needed, BUT YOU DO NEED THE PAPER ORDER.    Fasting is no food, drink, gum or mints other than water for 12 hrs.   Results will be back in 2-3 business days for most labs. It is always recommended for any orders (labs, xrays, ultrasounds,MRI, ct scan, procedures etc) to check with your insurance provider for expected costs or expenses to you.         You will get your results via phone from my medical assistant if you do not have MyChart.  OR  You will get your results via alaTestt    If a result is urgent, I will call to speak to you.    Vaccines:  Up to date    General recommendations:  Exercise-cardio 4-5d/wk 30min each day  Diet-Breakfast-toast (my favorite Leslie Ray Delighful Multigrain or Isaias's Killer Bread Good Seed thin-sliced)/bagel/English muffin-whole wheat flour as a 1st ingredient or cereal/oatmeal/granola bar-fiber 4g or more or protein like eggs or peanut butter; optional veggies  Lunch-protein, 1/2c carb or 2 slices bread, veg 1c  Dinner-protein, fist sized carb, veg 1c  Fruit 2 a day  Dairy 2 a day-milk, soy milk, almond milk, cheese, yogurt, cottage cheese  Snacks-Protein-hard boiled egg, nuts (walnuts/almonds/pecans/pistachios 1/4c), hummus, beef/deer jerky or meat sticks; vegetable, fruit, dairy-milk(1%, skim, almond, soy)/cheese (not a lot of cheddar)/yogurt (Greek is best-my favorite Dannon Fruit on the Bottom Greek)/cottage cheese 2%; triscuits/ popcorn/wheat thins have a lot of fiber; follow serving size on bag/box/container  increase water  Limit alcohol to 1 drink per day for women and 2 drinks per day for men (1 drink=12oz beer or 5oz wine or 1 1/2oz liquor)  Calcium: 500mg 1 twice a day if age 50 and younger and 600mg 1 twice a day if over age 50 (calcium citrate can be taken without food)  Vitamin D:  800-5000 IU/day  Limit salt to <2300mg a day if age 50 and under and <1500mg a day if over age 50/have high bp or diabetes or kidney disease  Recommend folate for childbearing age women 0.4mg per day (can be found in a multivitamin)  Recommend 18mg/dL of iron a day if age 50 and under and 8mg/dL a day if over age 50; take on an empty stomach at bedtime  Use sunscreen   Wear seatbelt  Recommend safe sex practices: using condoms everytime you have sex, discuss with a new partner about their past partners/history of STDs/drug use, avoid drinking alcohol or using drugs as this increases the chance that you will participate in high-risk sex, for oral sex help protect your mouth by having your partner use a condom (male or female), women should not douche after sex, be aware of your partner's body and your body-look for signs of a sore, blister, rash, or discharge, and have regular exams and periodic tests for STDs.  No distracted driving  No driving when under influence of substances  Wear a seatbelt  Eye dr every 1-2yrs  Dentist every 6-12 mon  Tetanus shot every 10yrs  Recommend flu vaccine in the fall  Appt in 6mon for follow up on diet, exercise, cholesterol and 1 year for physical      I will communicate with you via [employer] regarding messages and results. If you need help with this, you can call the support line at [phone].    IT WAS A PLEASURE TO SEE YOU TODAY. THANK YOU FOR CHOOSING US FOR YOUR HEALTHCARE NEEDS.

## 2024-04-13 ENCOUNTER — CLINICAL SUPPORT (OUTPATIENT)
Dept: SLEEP MEDICINE | Facility: CLINIC | Age: 44
End: 2024-04-13
Payer: COMMERCIAL

## 2024-04-13 DIAGNOSIS — R29.818 SUSPECTED SLEEP APNEA: ICD-10-CM

## 2024-04-13 DIAGNOSIS — G47.33 OBSTRUCTIVE SLEEP APNEA (ADULT) (PEDIATRIC): ICD-10-CM

## 2024-04-13 PROCEDURE — 95810 POLYSOM 6/> YRS 4/> PARAM: CPT | Performed by: PSYCHIATRY & NEUROLOGY

## 2024-04-14 ASSESSMENT — SLEEP AND FATIGUE QUESTIONNAIRES
HOW LIKELY ARE YOU TO NOD OFF OR FALL ASLEEP WHILE SITTING AND READING: MODERATE CHANCE OF DOZING
HOW LIKELY ARE YOU TO NOD OFF OR FALL ASLEEP WHILE SITTING QUIETLY AFTER LUNCH WITHOUT ALCOHOL: MODERATE CHANCE OF DOZING
ESS-CHAD TOTAL SCORE: 14
HOW LIKELY ARE YOU TO NOD OFF OR FALL ASLEEP WHILE WATCHING TV: SLIGHT CHANCE OF DOZING
HOW LIKELY ARE YOU TO NOD OFF OR FALL ASLEEP WHILE LYING DOWN TO REST IN THE AFTERNOON WHEN CIRCUMSTANCES PERMIT: HIGH CHANCE OF DOZING
HOW LIKELY ARE YOU TO NOD OFF OR FALL ASLEEP WHILE SITTING AND TALKING TO SOMEONE: SLIGHT CHANCE OF DOZING
HOW LIKELY ARE YOU TO NOD OFF OR FALL ASLEEP IN A CAR, WHILE STOPPED FOR A FEW MINUTES IN TRAFFIC: SLIGHT CHANCE OF DOZING
HOW LIKELY ARE YOU TO NOD OFF OR FALL ASLEEP WHEN YOU ARE A PASSENGER IN A CAR FOR AN HOUR WITHOUT A BREAK: HIGH CHANCE OF DOZING
SITING INACTIVE IN A PUBLIC PLACE LIKE A CLASS ROOM OR A MOVIE THEATER: SLIGHT CHANCE OF DOZING

## 2024-04-14 NOTE — PROGRESS NOTES
[facility] TECH NOTE:     Patient: Sakshi Kahn   MRN//AGE: [id number]  1980  43 y.o.   Technologist: Jessika Yang   Room: 4   Service Date: 2024        Sleep Testing Location: [facility]:     TECHNOLOGIST SLEEP STUDY PROCEDURE NOTE:   This sleep study is being conducted according to the policies and procedures outlined by the AAS accreditation standards.  The sleep study procedure and processes involved during this appointment was explained to the patient/patient’s family, questions were answered. The patient/family verbalized understanding.      The patient is a 43 y.o. year old female scheduled for a diagnostic PSG  with montage of:  SPLIT PSG . she arrived for her appointment.      The study that was ultimately completed was a diagnostic PSG  with montage of:  SPLIT PSG .    The full study Was completed.  Patient questionnaires completed?: yes     Consents signed? yes    Initial Fall Risk Screening:     Sakshi has not fallen in the last 6 months. her did not result in injury. Sakshi does not have a fear of falling. He does not need assistance with sitting, standing, or walking. she does not need assistance walking in her home. she does not need assistance in an unfamiliar setting. The patient is notusing an assistive device.     Brief Study observations: The patient is a 43 year old female here for a diagnostic PSG. Sakshi arrived at  and completed paperwork. This is her first PSG. Usual bedtime 2230. Wakes 3958-8826. Naps on the weekend. Sleeps in side and supine positions at home.  Commercial insurance-3%   The sleep study procedure and process involved during this appointment was explained to the patient and questions were answered. The patient verbalized understanding.     Deviation to order/protocol and reason: none        Other:None    After the procedure, the patient/family was informed to ensure followup with ordering clinician for testing results.      Technologist: Jessika  Trey

## 2024-04-22 ENCOUNTER — TELEPHONE (OUTPATIENT)
Dept: PRIMARY CARE | Facility: CLINIC | Age: 44
End: 2024-04-22
Payer: COMMERCIAL

## 2024-04-22 DIAGNOSIS — R40.0 DAYTIME SLEEPINESS: Primary | ICD-10-CM

## 2024-04-22 NOTE — TELEPHONE ENCOUNTER
----- Message from PELON Clifton sent at 4/22/2024  5:51 AM EDT -----  Mild sleep apnea noted. No cpap is recommended for this.  I recommend seeing a sleep med dr to further evaluate your daytime sleepiness. You can call [phone] to schedule this.   Epic is not allowing me to send a [employer]re[employer])hart result note.

## 2024-04-25 ENCOUNTER — OFFICE VISIT (OUTPATIENT)
Dept: SLEEP MEDICINE | Facility: HOSPITAL | Age: 44
End: 2024-04-25
Payer: COMMERCIAL

## 2024-04-25 DIAGNOSIS — G47.33 OSA (OBSTRUCTIVE SLEEP APNEA): Primary | ICD-10-CM

## 2024-04-25 DIAGNOSIS — R40.0 DAYTIME SLEEPINESS: ICD-10-CM

## 2024-04-25 PROCEDURE — 3008F BODY MASS INDEX DOCD: CPT | Performed by: INTERNAL MEDICINE

## 2024-04-25 PROCEDURE — 99203 OFFICE O/P NEW LOW 30 MIN: CPT | Performed by: INTERNAL MEDICINE

## 2024-04-25 NOTE — PROGRESS NOTES
Patient: Sakshi Kahn    [id number]  : 1980 -- AGE 43 y.o.    Provider: Shabbir Ott MD     [facility] [place]   Service Date: 2024              [facility]  New Visit Note      Virtual or Telephone Consent  An interactive audio and video telecommunication system which permits real time communications between the patient (at the originating site) and provider (at the distant site) was utilized to provide this telehealth service.   Verbal consent was requested and obtained from Sakshi Kahn on this date, 24 for a telehealth visit.       HISTORY OF PRESENT ILLNESS     The patient's referring provider is: Heike Gonzalez AP*    HISTORY OF PRESENT ILLNESS   Sakshi Kahn is a 43 y.o. female who presents to a [facility] for a sleep medicine evaluation.    The patient  has a past medical history of Clotting disorder (Multi) (antiphospholipid antibody syndrome; diagnosed in ), Other specified health status, Personal history of other infectious and parasitic diseases (2020), and Personal history of other venous thrombosis and embolism (2020)..    The referral is for an evaluation of snoring and hypersomnolence 2017 snoring. Has noted that it is worse.       Sleep-related ROS: To bed    530-600. Usually 1-2 times a night    RLS screen: none of the four  Sleep-related behaviors: none    Daytime Symptoms sleepiness is manifested is by passive sleepiness in the morning and in the afternoon Coffee AM Tired and feels sleepy.  Napping on weekend   ESS: 14       REVIEW OF SYSTEMS     REVIEW OF SYSTEMS  Review of Systems  SLEEP ROS: take naps during the day      ALLERGIES AND MEDICATIONS     ALLERGIES  Allergies   Allergen Reactions    Aspirin Other    Contrave [Naltrexone-Bupropion] Nausea/vomiting     Brain fog    Ibuprofen Bleeding       MEDICATIONS  Current Outpatient  Medications   Medication Sig Dispense Refill    cholecalciferol (Vitamin D-3) 50 mcg (2,000 unit) capsule Take 1 capsule (50 mcg) by mouth once daily.      docosahexaenoic acid/epa (FISH OIL ORAL) Take 1 capsule by mouth in the morning.      levonorgestrel (Mirena) 21 mcg/24 hours (8 yrs) 52 mg IUD 52 mg by intrauterine route 1 time.      magnesium oxide (Mag-Ox) 400 mg tablet Take 1 tablet (400 mg) by mouth once daily.      multivitamin tablet Take 1 tablet by mouth once daily.      rivaroxaban (Xarelto) 20 mg tablet Take 1 tablet (20 mg) by mouth once daily. Take with food. 30 tablet 2     No current facility-administered medications for this visit.         PAST HISTORY     PAST MEDICAL HISTORY  She  has a past medical history of Clotting disorder (Multi) (antiphospholipid antibody syndrome; diagnosed in 2020), Other specified health status, Personal history of other infectious and parasitic diseases (02/18/2020), and Personal history of other venous thrombosis and embolism (02/18/2020).  Back surgery in 2013 with 2020 Blood thinner    PAST SURGICAL HISTORY:  Past Surgical History:   Procedure Laterality Date    ADENOIDECTOMY  1983    BACK SURGERY  2013    OTHER SURGICAL HISTORY  02/18/2020    Tonsillectomy with adenoidectomy    OTHER SURGICAL HISTORY  02/18/2020    Lumbar laminectomy       FAMILY HISTORY  Family History   Problem Relation Name Age of Onset    Atrial fibrillation Mother Kimberly Desouza     No Known Problems Father      Other (Cerebrovascular accident) Maternal Grandmother Leta Araiza     Stroke Maternal Grandmother Leta Araiza     Other (cerebrovascular accident) Other cousin     Long QT syndrome Other cousin     Psoriasis Other           SOCIAL HISTORY  She  reports that she has never smoked. She has never used smokeless tobacco. She reports current alcohol use of about 2.0 - 3.0 standard drinks of alcohol per week. She reports that she does not use drugs. She currently lives with her  .       PHYSICAL EXAM     VITAL SIGNS: LMP 03/25/2024 (Exact Date)      CURRENT WEIGHT: [unfilled]  BMI: [unfilled]  PREVIOUS WEIGHTS:  Wt Readings from Last 3 Encounters:   04/04/24 104 kg (229 lb)   02/19/24 102 kg (225 lb)   01/18/24 103 kg (226 lb 3.1 oz)       Physical Exam  Speaks well. No masses or obvious neck issues.       RESULTS/DATA     Iron (ug/dL)   Date Value   05/04/2022 47     Iron Saturation (%)   Date Value   05/04/2022 11 (L)     TIBC (ug/dL)   Date Value   05/04/2022 413     Ferritin (ug/L)   Date Value   05/04/2022 12           ASSESSMENT/PLAN     Ms. Kahn is a 43 y.o. female and  has a past medical history of Clotting disorder (Multi) (antiphospholipid antibody syndrome; diagnosed in 2020), Other specified health status, Personal history of other infectious and parasitic diseases (02/18/2020), and Personal history of other venous thrombosis and embolism (02/18/2020). She was referred to the [facility] for evaluation of sleepiness    PABLITO mild but with high grade upper airway and daytime sleepiness 14 that is passive but impairing ADL.  Currently working out 1-2 times a week, and working on weight loss. Discussed oral appliance and bariatric approaches. Will try CPAP first.          Problem List and Orders

## 2024-05-12 ENCOUNTER — OFFICE VISIT (OUTPATIENT)
Dept: URGENT CARE | Facility: CLINIC | Age: 44
End: 2024-05-12
Payer: COMMERCIAL

## 2024-05-12 VITALS
TEMPERATURE: 97.9 F | SYSTOLIC BLOOD PRESSURE: 110 MMHG | DIASTOLIC BLOOD PRESSURE: 77 MMHG | RESPIRATION RATE: 20 BRPM | WEIGHT: 230 LBS | HEART RATE: 94 BPM | BODY MASS INDEX: 37.12 KG/M2 | OXYGEN SATURATION: 96 %

## 2024-05-12 DIAGNOSIS — J01.90 ACUTE SINUSITIS, RECURRENCE NOT SPECIFIED, UNSPECIFIED LOCATION: Primary | ICD-10-CM

## 2024-05-12 PROCEDURE — 99213 OFFICE O/P EST LOW 20 MIN: CPT | Performed by: PHYSICIAN ASSISTANT

## 2024-05-12 PROCEDURE — 1036F TOBACCO NON-USER: CPT | Performed by: PHYSICIAN ASSISTANT

## 2024-05-12 PROCEDURE — 3008F BODY MASS INDEX DOCD: CPT | Performed by: PHYSICIAN ASSISTANT

## 2024-05-12 RX ORDER — AMOXICILLIN AND CLAVULANATE POTASSIUM 875; 125 MG/1; MG/1
1 TABLET, FILM COATED ORAL 2 TIMES DAILY
Qty: 20 TABLET | Refills: 0 | Status: SHIPPED | OUTPATIENT
Start: 2024-05-12 | End: 2024-05-22

## 2024-05-12 RX ORDER — BENZONATATE 100 MG/1
100 CAPSULE ORAL 3 TIMES DAILY PRN
Qty: 30 CAPSULE | Refills: 0 | Status: SHIPPED | OUTPATIENT
Start: 2024-05-12 | End: 2024-05-22

## 2024-05-12 ASSESSMENT — ENCOUNTER SYMPTOMS
SINUS PAIN: 1
SINUS COMPLAINT: 1
SINUS PRESSURE: 1
COUGH: 1

## 2024-05-12 NOTE — PROGRESS NOTES
Subjective   Patient ID: Sakhsi Kahn is a 43 y.o. female.    Patient is a 43-year-old female who complains of worsening congestion, sinus pressure, sore throat and cough that she has been experiencing for the past 1 week.  Patient denies fever, chills or myalgia.      Sinus Problem  Associated symptoms: congestion and cough    The following portions of the chart were reviewed this encounter and updated as appropriate:       Review of Systems   HENT:  Positive for congestion, sinus pressure, sinus pain and sneezing.    Respiratory:  Positive for cough.    All other systems reviewed and are negative.  Objective   Physical Exam  Vitals and nursing note reviewed.   Constitutional:       Appearance: Normal appearance. She is normal weight.   HENT:      Head: Normocephalic and atraumatic.      Right Ear: Tympanic membrane, ear canal and external ear normal.      Left Ear: Tympanic membrane, ear canal and external ear normal.      Nose: Nose normal. No congestion or rhinorrhea.      Mouth/Throat:      Mouth: Mucous membranes are moist.      Pharynx: Oropharynx is clear. No oropharyngeal exudate or posterior oropharyngeal erythema.   Eyes:      Extraocular Movements: Extraocular movements intact.      Conjunctiva/sclera: Conjunctivae normal.      Pupils: Pupils are equal, round, and reactive to light.   Cardiovascular:      Rate and Rhythm: Normal rate and regular rhythm.      Pulses: Normal pulses.      Heart sounds: Normal heart sounds.   Pulmonary:      Effort: Pulmonary effort is normal. No respiratory distress.      Breath sounds: Normal breath sounds. No stridor. No wheezing, rhonchi or rales.   Musculoskeletal:      Cervical back: Normal range of motion and neck supple.   Skin:     General: Skin is warm and dry.      Capillary Refill: Capillary refill takes less than 2 seconds.   Neurological:      General: No focal deficit present.      Mental Status: She is alert and oriented to person, place, and time.    Psychiatric:         Mood and Affect: Mood normal.         Behavior: Behavior normal.         Thought Content: Thought content normal.         Judgment: Judgment normal.     Assessment/Plan   Physical exam findings as noted above.  Patient was provided with prescriptions for Augmentin 875-125 mg and Tessalon 100 mg.  Supportive care instructions were discussed and the patient verbalizes good understanding of same.    CLINICAL IMPRESSION:  Acute Sinusitis    Diagnoses and all orders for this visit:  Acute sinusitis, recurrence not specified, unspecified location  -     amoxicillin-pot clavulanate (Augmentin) 875-125 mg tablet; Take 1 tablet by mouth 2 times a day for 10 days.  -     benzonatate (Tessalon Perles) 100 mg capsule; Take 1 capsule (100 mg) by mouth 3 times a day as needed for cough for up to 10 days.    Patient disposition: Home

## 2024-05-15 ENCOUNTER — OFFICE VISIT (OUTPATIENT)
Dept: URGENT CARE | Facility: CLINIC | Age: 44
End: 2024-05-15
Payer: COMMERCIAL

## 2024-05-15 VITALS
TEMPERATURE: 97.6 F | DIASTOLIC BLOOD PRESSURE: 83 MMHG | HEART RATE: 87 BPM | RESPIRATION RATE: 18 BRPM | SYSTOLIC BLOOD PRESSURE: 128 MMHG

## 2024-05-15 DIAGNOSIS — H69.91 DYSFUNCTION OF RIGHT EUSTACHIAN TUBE: Primary | ICD-10-CM

## 2024-05-15 PROCEDURE — 3008F BODY MASS INDEX DOCD: CPT | Performed by: PHYSICIAN ASSISTANT

## 2024-05-15 PROCEDURE — 99213 OFFICE O/P EST LOW 20 MIN: CPT | Performed by: PHYSICIAN ASSISTANT

## 2024-05-15 RX ORDER — PREDNISONE 20 MG/1
20 TABLET ORAL 2 TIMES DAILY
Qty: 6 TABLET | Refills: 0 | Status: SHIPPED | OUTPATIENT
Start: 2024-05-15 | End: 2024-05-18

## 2024-05-15 ASSESSMENT — PAIN SCALES - GENERAL: PAINLEVEL: 6

## 2024-05-17 PROBLEM — H69.91 DYSFUNCTION OF RIGHT EUSTACHIAN TUBE: Status: ACTIVE | Noted: 2024-05-17

## 2024-05-17 NOTE — PROGRESS NOTES
Subjective   Patient ID: Sakshi Kahn is a 43 y.o. female.    Patient is a 43-year-old female who complains of worsening right ear pain that she has been experiencing for the past 1 day.  Patient was seen and evaluated by myself at this urgent care facility on 12 May 2024.  Patient was diagnosed with an acute sinus infection at that time and placed on Augmentin and Tessalon.  Patient states that she has been taking that medication but has developed significant right ear pain and pressure since then.  Patient states that her left ear is asymptomatic and nontender.  Patient reports no fever, chills or other illness symptoms.      Earache     The following portions of the chart were reviewed this encounter and updated as appropriate:       Review of Systems   HENT:  Positive for ear pain.    All other systems reviewed and are negative.  Objective   Physical Exam  Vitals and nursing note reviewed.   Constitutional:       Appearance: Normal appearance. She is normal weight.   HENT:      Head: Normocephalic and atraumatic.      Right Ear: Tympanic membrane, ear canal and external ear normal.      Left Ear: Tympanic membrane, ear canal and external ear normal.      Nose: Nose normal. No congestion or rhinorrhea.      Mouth/Throat:      Mouth: Mucous membranes are moist.      Pharynx: Oropharynx is clear. No oropharyngeal exudate or posterior oropharyngeal erythema.   Eyes:      Extraocular Movements: Extraocular movements intact.      Conjunctiva/sclera: Conjunctivae normal.      Pupils: Pupils are equal, round, and reactive to light.   Cardiovascular:      Rate and Rhythm: Normal rate and regular rhythm.      Pulses: Normal pulses.      Heart sounds: Normal heart sounds.   Pulmonary:      Effort: Pulmonary effort is normal. No respiratory distress.      Breath sounds: Normal breath sounds. No stridor. No wheezing, rhonchi or rales.   Musculoskeletal:      Cervical back: Normal range of motion and neck supple.    Skin:     General: Skin is warm and dry.      Capillary Refill: Capillary refill takes less than 2 seconds.   Neurological:      General: No focal deficit present.      Mental Status: She is alert and oriented to person, place, and time.   Psychiatric:         Mood and Affect: Mood normal.         Behavior: Behavior normal.         Thought Content: Thought content normal.         Judgment: Judgment normal.     Assessment/Plan   Unremarkable physical exam findings as noted above.  Patient was provided with a prescription for prednisone 20 mg and advised to continue taking her previous medications as directed.  Additional supportive care was reviewed and the patient verbalizes clear understanding of same.    CLINICAL IMPRESSION:  ETD Right Ear    Diagnoses and all orders for this visit:  Dysfunction of right eustachian tube  -     predniSONE (Deltasone) 20 mg tablet; Take 1 tablet (20 mg) by mouth 2 times a day for 3 days.    Patient disposition: Home

## 2024-06-03 ENCOUNTER — TELEPHONE (OUTPATIENT)
Dept: HEMATOLOGY/ONCOLOGY | Facility: CLINIC | Age: 44
End: 2024-06-03
Payer: COMMERCIAL

## 2024-06-03 DIAGNOSIS — R76.0 ANTIPHOSPHOLIPID ANTIBODY POSITIVE: ICD-10-CM

## 2024-06-03 NOTE — TELEPHONE ENCOUNTER
VM Her pharmacy will be reaching out to refill her Xarelto.  Patient going to need gum surgery and thinks she should stay on Xarelto for now instead of the Warfarin like they discussed last visit.  Asking for 90 day refill of Xarelto to cover the gum surgery.  Call her with questions.  She is not available until early afternoon- presenting/ doing a training for work.

## 2024-07-11 DIAGNOSIS — G47.33 OSA (OBSTRUCTIVE SLEEP APNEA): Primary | ICD-10-CM

## 2024-10-06 PROBLEM — R29.818 SUSPECTED SLEEP APNEA: Status: RESOLVED | Noted: 2023-12-29 | Resolved: 2024-10-06

## 2024-10-06 PROBLEM — H69.91 DYSFUNCTION OF RIGHT EUSTACHIAN TUBE: Status: RESOLVED | Noted: 2024-05-17 | Resolved: 2024-10-06

## 2024-10-06 PROBLEM — J01.90 ACUTE SINUSITIS: Status: RESOLVED | Noted: 2023-01-18 | Resolved: 2024-10-06

## 2024-10-06 ASSESSMENT — ENCOUNTER SYMPTOMS
SHORTNESS OF BREATH: 0
CONSTITUTIONAL NEGATIVE: 1
WHEEZING: 0

## 2024-10-06 NOTE — PROGRESS NOTES
"Subjective   Patient ID: Sakshi Kahn is a 44 y.o. female who presents for Follow-up.  Last physical:4/4/24    Is pt fasting? yes  Does pt want flu shot? No   Using cpap nightly? No , issues with mask  Which hip hurts on off since feb? Left   Any fall or injury back when it started? No   Any other questions or concerns that pt wants to discuss today? Would like a new referral for allergist.        had surgery 3wks ago and doing well    HPI  Last labs-3/2024 ldl 128  12/2023 Trigs and hdl normal.  Ldl high at 133. Goal <100. Decr fats and incr fiber. Last time it was 123. We can discuss this more at your appt.  Sugar (aka glucose), kidney function, liver function and electrolytes in the CMP (comprehensive metabolic panel) were normal.  TSH (thyroid test) was normal.  CBC (complete blood count) was normal which looks at infection and anemia markers.  1/2023 cmp nl, tsh nl, lipid ldl 123, vit d low at 27  Due for labs- vit d-print, lipid    No results found for: \"CHOL\"  No results found for: \"TRIG\"  No results found for: \"HDL\"  No results found for: \"LDL\"  TSH   Date Value Ref Range Status   02/28/2020 1.63 0.44 - 3.98 mIU/L Final     Comment:      TSH testing is performed using different testing    methodology at [facility] than at other    [facility]. Direct result comparisons should    only be made within the same method.       No results found for: \"A1C\"  No components found for: \"POCA1C\"  No results found for: \"ALBUR\"  No components found for: \"POCALBUR\"    Lf hip/groin pain on off the last 6 months   No fall or injury  No redness rash or swelling.  numbness or tingling toes on off and lf buttock  Pain will sometimes go outer side of leg to knee  Sleep difficult due to pain  Worse if sit for a long time and stand up; catches and has to wait 10sec to move  Lower lf back pain on off now  Selftxt:can't go to gym because it caused pain at times, tylenol    Nasal congestion ramping up. " Wants new allergist    Exercise-none; right now due to hip/back pain  Diet-3 meals and 1-2 snacks  Water, juice, almond milk    Immunization History   Administered Date(s) Administered    Flu vaccine (IIV4), preservative free *Check age/dose* 12/10/2020    Influenza, seasonal, injectable 10/13/2021    Moderna SARS-CoV-2 Vaccination 11/18/2021    Pfizer Purple Cap SARS-CoV-2 03/21/2021, 04/11/2021    Pneumococcal Conjugate PCV 7 1978    Tdap vaccine, age 7 year and older (BOOSTRIX, ADACEL) 06/12/2015, 03/31/2016, 10/13/2021        No care team member to display     Review of Systems   Constitutional: Negative.    HENT:  Positive for congestion.    Respiratory:  Negative for shortness of breath and wheezing.    Cardiovascular:  Negative for chest pain.   Musculoskeletal:  Positive for back pain.       Objective   Visit Vitals  /80   Pulse 84   Temp 36.3 °C (97.3 °F)      BP Readings from Last 3 Encounters:   10/08/24 116/80   05/15/24 128/83   05/12/24 110/77     Wt Readings from Last 3 Encounters:   10/08/24 105 kg (230 lb 6.4 oz)   05/12/24 104 kg (230 lb)   04/04/24 104 kg (229 lb)       The ASCVD Risk score (Shanika DK, et al., 2019) failed to calculate for the following reasons:    Cannot find a previous HDL lab    Cannot find a previous total cholesterol lab     Physical Exam  Constitutional:       General: She is not in acute distress.     Appearance: Normal appearance.   Musculoskeletal:      Comments: Lf lower back pain. No redness rash or swelling.   Neurological:      Mental Status: She is alert.         Assessment/Plan   Diagnoses and all orders for this visit:  Pure hypercholesterolemia  -     Lipid Panel; Future  Nasal congestion  -     Referral to Allergy; Future  Sciatica of left side  -     predniSONE (Deltasone) 20 mg tablet; 3 tabs daily x3d then 2 tabs daily x 3d then 1 tab daily x 3d then 1/2 tab daily x 3d with food  -     orphenadrine (Norflex) 100 mg 12 hr tablet; Take 1 tablet (100  mg) by mouth 2 times a day as needed for muscle spasms or mild pain (1 - 3). Do not crush, chew, or split.  -     Referral to Physical Therapy; Future  Other orders  -     Follow Up In Primary Care - Health Maintenance; Future

## 2024-10-08 ENCOUNTER — APPOINTMENT (OUTPATIENT)
Dept: PRIMARY CARE | Facility: CLINIC | Age: 44
End: 2024-10-08
Payer: COMMERCIAL

## 2024-10-08 ENCOUNTER — LAB (OUTPATIENT)
Dept: LAB | Facility: LAB | Age: 44
End: 2024-10-08
Payer: COMMERCIAL

## 2024-10-08 VITALS
DIASTOLIC BLOOD PRESSURE: 80 MMHG | HEART RATE: 84 BPM | SYSTOLIC BLOOD PRESSURE: 116 MMHG | HEIGHT: 66 IN | WEIGHT: 230.4 LBS | TEMPERATURE: 97.3 F | OXYGEN SATURATION: 95 % | BODY MASS INDEX: 37.03 KG/M2

## 2024-10-08 DIAGNOSIS — E78.00 PURE HYPERCHOLESTEROLEMIA: ICD-10-CM

## 2024-10-08 DIAGNOSIS — E55.9 VITAMIN D DEFICIENCY: ICD-10-CM

## 2024-10-08 DIAGNOSIS — R09.81 NASAL CONGESTION: ICD-10-CM

## 2024-10-08 DIAGNOSIS — M54.32 SCIATICA OF LEFT SIDE: ICD-10-CM

## 2024-10-08 DIAGNOSIS — E78.00 PURE HYPERCHOLESTEROLEMIA: Primary | ICD-10-CM

## 2024-10-08 LAB
25(OH)D3 SERPL-MCNC: 49 NG/ML (ref 30–100)
CHOLEST SERPL-MCNC: 196 MG/DL (ref 0–199)
CHOLESTEROL/HDL RATIO: 4
HDLC SERPL-MCNC: 48.4 MG/DL
LDLC SERPL CALC-MCNC: 130 MG/DL
NON HDL CHOLESTEROL: 148 MG/DL (ref 0–149)
TRIGL SERPL-MCNC: 87 MG/DL (ref 0–149)
VLDL: 17 MG/DL (ref 0–40)

## 2024-10-08 PROCEDURE — 99214 OFFICE O/P EST MOD 30 MIN: CPT | Performed by: NURSE PRACTITIONER

## 2024-10-08 PROCEDURE — 3008F BODY MASS INDEX DOCD: CPT | Performed by: NURSE PRACTITIONER

## 2024-10-08 PROCEDURE — 1036F TOBACCO NON-USER: CPT | Performed by: NURSE PRACTITIONER

## 2024-10-08 PROCEDURE — 82306 VITAMIN D 25 HYDROXY: CPT

## 2024-10-08 PROCEDURE — 80061 LIPID PANEL: CPT

## 2024-10-08 PROCEDURE — 36415 COLL VENOUS BLD VENIPUNCTURE: CPT

## 2024-10-08 RX ORDER — ORPHENADRINE CITRATE 100 MG/1
100 TABLET, EXTENDED RELEASE ORAL 2 TIMES DAILY PRN
Qty: 60 TABLET | Refills: 5 | Status: SHIPPED | OUTPATIENT
Start: 2024-10-08 | End: 2025-04-06

## 2024-10-08 RX ORDER — PREDNISONE 20 MG/1
TABLET ORAL
Qty: 20 TABLET | Refills: 0 | Status: SHIPPED | OUTPATIENT
Start: 2024-10-08

## 2024-10-08 ASSESSMENT — ENCOUNTER SYMPTOMS: BACK PAIN: 1

## 2024-10-08 NOTE — PATIENT INSTRUCTIONS
Reach out to sleep med for full mask    See allergist    Prednisone  No advil, motrin, ibuprofen, aleve, naproxen or other anti-inflammatories while on prednisone.  Tylenol (acetaminophen) is OK to take.   Orphenadrine-muscle relaxant-watch for drowsiness    Exercise-cardio 4-5d/wk 30min each day  Diet-Breakfast-toast (my favorite Leslie [employer] Delightful multi-grain and Isai[employer] Killer Bread thin-sliced Good Seed)/bagel/English muffin-whole wheat flour as a 1st ingredient or cereal/oatmeal/granola bar-fiber 4g or more; protein like eggs or peanut butter is Ok  Lunch-protein, veg 1c  Dinner-protein, veg 1c; if having potatoes, rice, noodles, or pasta-->fist sized; could try brown rice or whole wheat pasta or quinoa  Fruit 2 a day  Dairy 2 a day-milk, almond milk, soy milk, yogurt, cottage cheese, yogurt  Snacks-Protein-hard boiled egg, nuts (walnuts/almonds/pecans/pistachios 1/4c), hummus, beef/deer jerky; vegetable, fruit, dairy-milk(1%, skim, almond, soy)/cheese (not a lot of cheddar)/yogurt (Greek is best-my favorite Dannon Fruit on the Bottom Greek)/cottage cheese 2%; triscuits, popcorn have a lot of fiber; serving size  Water  Limit alcohol to 2 drinks per day (1 drink=12oz beer or 5oz wine or 1 1/2oz liquor)  appt in  6  months; be fasting      I will communicate with you via [employer] regarding messages and results. If you need help with this, you can call the support line at [phone].    IT WAS A PLEASURE TO SEE YOU TODAY. THANK YOU FOR CHOOSING US FOR YOUR HEALTHCARE NEEDS.

## 2024-10-09 ENCOUNTER — TELEPHONE (OUTPATIENT)
Dept: PRIMARY CARE | Facility: CLINIC | Age: 44
End: 2024-10-09
Payer: COMMERCIAL

## 2024-10-09 NOTE — TELEPHONE ENCOUNTER
----- Message from Heike Gonzalez sent at 10/9/2024  6:53 AM EDT -----  Trigs normal  Hdl slightly low at 48. Goal >50 for women. Incr exercise.  Ldl high at 130. Goal <100. Decr fats and incr fiber.  Last time 128.  Vitamin d normal.  Continue otc vitamin d.  I can't send via [employer] for some reason.

## 2024-10-13 ENCOUNTER — OFFICE VISIT (OUTPATIENT)
Dept: URGENT CARE | Age: 44
End: 2024-10-13
Payer: COMMERCIAL

## 2024-10-13 VITALS
RESPIRATION RATE: 18 BRPM | DIASTOLIC BLOOD PRESSURE: 86 MMHG | WEIGHT: 228 LBS | BODY MASS INDEX: 36.64 KG/M2 | TEMPERATURE: 97.9 F | HEART RATE: 91 BPM | SYSTOLIC BLOOD PRESSURE: 124 MMHG | OXYGEN SATURATION: 98 % | HEIGHT: 66 IN

## 2024-10-13 DIAGNOSIS — J01.11 ACUTE RECURRENT FRONTAL SINUSITIS: Primary | ICD-10-CM

## 2024-10-13 PROCEDURE — 3008F BODY MASS INDEX DOCD: CPT | Performed by: PHYSICIAN ASSISTANT

## 2024-10-13 PROCEDURE — 99203 OFFICE O/P NEW LOW 30 MIN: CPT | Performed by: PHYSICIAN ASSISTANT

## 2024-10-13 PROCEDURE — 1036F TOBACCO NON-USER: CPT | Performed by: PHYSICIAN ASSISTANT

## 2024-10-13 RX ORDER — AMOXICILLIN AND CLAVULANATE POTASSIUM 875; 125 MG/1; MG/1
1 TABLET, FILM COATED ORAL 2 TIMES DAILY
Qty: 20 TABLET | Refills: 0 | Status: SHIPPED | OUTPATIENT
Start: 2024-10-13 | End: 2024-10-23

## 2024-10-13 ASSESSMENT — ENCOUNTER SYMPTOMS
SORE THROAT: 0
ARTHRALGIAS: 0
JOINT SWELLING: 0
FATIGUE: 0
HEADACHES: 1
WEAKNESS: 0
SINUS PRESSURE: 1
CHEST TIGHTNESS: 0
SINUS PAIN: 1
FEVER: 0
AGITATION: 0
ABDOMINAL PAIN: 0
CHILLS: 0
NAUSEA: 0
VOMITING: 0
COUGH: 0
DIARRHEA: 0
SHORTNESS OF BREATH: 0
CONFUSION: 0
RHINORRHEA: 1
DIZZINESS: 0

## 2024-10-13 ASSESSMENT — PAIN SCALES - GENERAL: PAINLEVEL: 7

## 2024-10-13 NOTE — PATIENT INSTRUCTIONS
Take medication as instructed  Continue sinus wash/Flonase, continue allergy med daily  F/U with PCP if no improvement

## 2024-10-13 NOTE — PROGRESS NOTES
Subjective   Patient ID: Sakshi Kahn is a 44 y.o. female. They present today with a chief complaint of Sinusitis.    History of Present Illness  Pt states has recurrent sinus problem. Symptoms started 2wks ago. She has been doing allergy med, sudafed, mucinex w/o improvement. No fever, chills.       Sinusitis  Associated symptoms: congestion, ear pain, headaches and rhinorrhea    Associated symptoms: no chest pain, no chills, no cough, no fatigue, no fever, no nausea, no shortness of breath, no sore throat and no vomiting        Past Medical History  Allergies as of 10/13/2024 - Reviewed 10/13/2024   Allergen Reaction Noted    Aspirin Other 10/05/2023    Contrave [naltrexone-bupropion] Nausea/vomiting 03/08/2024    Ibuprofen Bleeding 10/05/2023       (Not in a hospital admission)       Past Medical History:   Diagnosis Date    Clotting disorder (Multi) antiphospholipid antibody syndrome; diagnosed in 2020    Other specified health status     No pertinent past medical history    Personal history of other infectious and parasitic diseases 02/18/2020    History of gonorrhea    Personal history of other venous thrombosis and embolism 02/18/2020    History of deep venous thrombosis       Past Surgical History:   Procedure Laterality Date    ADENOIDECTOMY  1983    BACK SURGERY  2013    OTHER SURGICAL HISTORY  02/18/2020    Tonsillectomy with adenoidectomy    OTHER SURGICAL HISTORY  02/18/2020    Lumbar laminectomy        reports that she has never smoked. She has never used smokeless tobacco. She reports current alcohol use of about 2.0 - 3.0 standard drinks of alcohol per week. She reports that she does not use drugs.    Review of Systems  Review of Systems   Constitutional:  Negative for chills, fatigue and fever.   HENT:  Positive for congestion, ear pain, rhinorrhea, sinus pressure and sinus pain. Negative for sore throat.    Respiratory:  Negative for cough, chest tightness and shortness of breath.   "  Cardiovascular:  Negative for chest pain.   Gastrointestinal:  Negative for abdominal pain, diarrhea, nausea and vomiting.   Musculoskeletal:  Negative for arthralgias and joint swelling.   Skin:  Negative for rash.   Neurological:  Positive for headaches. Negative for dizziness and weakness.   Psychiatric/Behavioral:  Negative for agitation and confusion.                                   Objective    Vitals:    10/13/24 0932   BP: 124/86   Pulse: 91   Resp: 18   Temp: 36.6 °C (97.9 °F)   TempSrc: Oral   SpO2: 98%   Weight: 103 kg (228 lb)   Height: 1.676 m (5' 6\")     Patient's last menstrual period was 09/15/2024 (exact date).    Physical Exam  Constitutional:       Appearance: Normal appearance.   HENT:      Head: Normocephalic and atraumatic.      Right Ear: Tympanic membrane, ear canal and external ear normal.      Left Ear: Tympanic membrane, ear canal and external ear normal.      Nose:      Right Sinus: Frontal sinus tenderness present. No maxillary sinus tenderness.      Left Sinus: Frontal sinus tenderness present. No maxillary sinus tenderness.      Mouth/Throat:      Pharynx: No oropharyngeal exudate or posterior oropharyngeal erythema.   Cardiovascular:      Rate and Rhythm: Normal rate and regular rhythm.      Heart sounds: No murmur heard.  Pulmonary:      Effort: Pulmonary effort is normal. No respiratory distress.      Breath sounds: No stridor. No wheezing, rhonchi or rales.   Musculoskeletal:         General: No swelling or tenderness. Normal range of motion.   Neurological:      Mental Status: She is alert and oriented to person, place, and time.   Psychiatric:         Mood and Affect: Mood normal.         Procedures    Point of Care Test & Imaging Results from this visit  No results found for this visit on 10/13/24.   No results found.    Diagnostic study results (if any) were reviewed by Suzanna Hall PA-C.    Assessment/Plan   Allergies, medications, history, and pertinent labs/EKGs/Imaging " reviewed by Suzanna Hall PA-C.     Medical Decision Making  Recurrent sinusitis and symptoms not better in 2wks  Will initiate treatment with ABx    Orders and Diagnoses  Diagnoses and all orders for this visit:  Acute recurrent frontal sinusitis  -     amoxicillin-pot clavulanate (Augmentin) 875-125 mg tablet; Take 1 tablet by mouth 2 times a day for 10 days.      Medical Admin Record      Patient disposition: Home    Electronically signed by Suzanna Hall PA-C  9:57 AM

## 2024-10-14 ENCOUNTER — TELEPHONE (OUTPATIENT)
Dept: HEMATOLOGY/ONCOLOGY | Facility: CLINIC | Age: 44
End: 2024-10-14
Payer: COMMERCIAL

## 2024-10-14 NOTE — TELEPHONE ENCOUNTER
Patient having gum surgery in November.  Calling to find out if needs to bridge with Lovenox?  Uses [facility] in [place] on [address].

## 2024-10-14 NOTE — TELEPHONE ENCOUNTER
Returned call to patient. Patient stated she is having gum surgery on November 14th and inquiring about anticoagulation bridging. Advised we will set up a phone call visit two weeks prior to discuss anticoagulation bridging and review instructions. Patient agreeable to 10/29/24 at 4PM. Our office fax number provided for dental clearance form. Pt had no further questions.

## 2024-10-26 ENCOUNTER — TELEMEDICINE CLINICAL SUPPORT (OUTPATIENT)
Dept: PRIMARY CARE | Facility: CLINIC | Age: 44
End: 2024-10-26
Payer: COMMERCIAL

## 2024-10-26 ENCOUNTER — OFFICE VISIT (OUTPATIENT)
Dept: URGENT CARE | Age: 44
End: 2024-10-26
Payer: COMMERCIAL

## 2024-10-26 VITALS
OXYGEN SATURATION: 98 % | SYSTOLIC BLOOD PRESSURE: 117 MMHG | RESPIRATION RATE: 16 BRPM | TEMPERATURE: 98.5 F | HEART RATE: 99 BPM | DIASTOLIC BLOOD PRESSURE: 84 MMHG

## 2024-10-26 DIAGNOSIS — U07.1 COVID: Primary | ICD-10-CM

## 2024-10-26 DIAGNOSIS — Z20.822 EXPOSURE TO COVID-19 VIRUS: ICD-10-CM

## 2024-10-26 DIAGNOSIS — J06.9 ACUTE RESPIRATORY DISEASE: ICD-10-CM

## 2024-10-26 LAB — POC SARS-COV-2 AG BINAX: ABNORMAL

## 2024-10-26 RX ORDER — METHYLPREDNISOLONE 4 MG/1
TABLET ORAL
Qty: 21 TABLET | Refills: 0 | Status: SHIPPED | OUTPATIENT
Start: 2024-10-26 | End: 2024-11-02

## 2024-10-26 RX ORDER — BENZONATATE 100 MG/1
200 CAPSULE ORAL EVERY 8 HOURS
Qty: 60 CAPSULE | Refills: 0 | Status: SHIPPED | OUTPATIENT
Start: 2024-10-26 | End: 2024-11-05

## 2024-10-26 RX ORDER — DOXYCYCLINE 100 MG/1
100 CAPSULE ORAL 2 TIMES DAILY
Qty: 20 CAPSULE | Refills: 0 | Status: SHIPPED | OUTPATIENT
Start: 2024-10-30 | End: 2024-11-09

## 2024-10-26 ASSESSMENT — ENCOUNTER SYMPTOMS
WHEEZING: 0
COUGH: 1
FEVER: 0
CHILLS: 0
STRIDOR: 0
FATIGUE: 1
CARDIOVASCULAR NEGATIVE: 1
SHORTNESS OF BREATH: 0
HEADACHES: 1

## 2024-10-26 NOTE — PROGRESS NOTES
Subjective   Patient ID: Sakshi Kahn is a 44 y.o. female. They present today with a chief complaint of Nasal Congestion, Cough, Headache, and Generalized Body Aches (X 3 days. TD-MA).    History of Present Illness    Cough  Associated symptoms include headaches. Pertinent negatives include no chills, fever, shortness of breath or wheezing.   Headache  Associated symptoms: congestion, cough and fatigue    Associated symptoms: no fever      Patient presents to the urgent care for a chief complaint of cough and congestion over the last 3 days, patient reports just finished amoxicillin after being treated for sinus infection  Past Medical History  Allergies as of 10/26/2024 - Reviewed 10/26/2024   Allergen Reaction Noted    Aspirin Other 10/05/2023    Contrave [naltrexone-bupropion] Nausea/vomiting 03/08/2024    Ibuprofen Bleeding 10/05/2023    Nsaids (non-steroidal anti-inflammatory drug) Bleeding 10/26/2024       (Not in a hospital admission)       Past Medical History:   Diagnosis Date    Clotting disorder (Multi) antiphospholipid antibody syndrome; diagnosed in 2020    Other specified health status     No pertinent past medical history    Personal history of other infectious and parasitic diseases 02/18/2020    History of gonorrhea    Personal history of other venous thrombosis and embolism 02/18/2020    History of deep venous thrombosis       Past Surgical History:   Procedure Laterality Date    ADENOIDECTOMY  1983    BACK SURGERY  2013    OTHER SURGICAL HISTORY  02/18/2020    Tonsillectomy with adenoidectomy    OTHER SURGICAL HISTORY  02/18/2020    Lumbar laminectomy        reports that she has never smoked. She has never used smokeless tobacco. She reports current alcohol use of about 2.0 - 3.0 standard drinks of alcohol per week. She reports that she does not use drugs.    Review of Systems  Review of Systems   Constitutional:  Positive for fatigue. Negative for chills and fever.   HENT:  Positive for  congestion.    Respiratory:  Positive for cough. Negative for shortness of breath, wheezing and stridor.    Cardiovascular: Negative.    Neurological:  Positive for headaches.   Herbert Montoya open MEERA                               Objective    Vitals:    10/26/24 1400   BP: 117/84   Pulse: 99   Resp: 16   Temp: 36.9 °C (98.5 °F)   SpO2: 98%     Patient's last menstrual period was 09/15/2024 (exact date).    Physical Exam  Vitals and nursing note reviewed.   Constitutional:       General: She is not in acute distress.     Appearance: Normal appearance. She is not ill-appearing, toxic-appearing or diaphoretic.   Cardiovascular:      Rate and Rhythm: Normal rate and regular rhythm.      Pulses: Normal pulses.      Heart sounds: Normal heart sounds.   Pulmonary:      Effort: Pulmonary effort is normal. No respiratory distress.      Breath sounds: Normal breath sounds. No stridor. No wheezing, rhonchi or rales.   Chest:      Chest wall: No tenderness.   Neurological:      General: No focal deficit present.      Mental Status: She is alert and oriented to person, place, and time.   Psychiatric:         Mood and Affect: Mood normal.         Behavior: Behavior normal.         Procedures    Point of Care Test & Imaging Results from this visit  Results for orders placed or performed in visit on 10/26/24   POCT Covid-19 Rapid Antigen   Result Value Ref Range    POC KRISTAL-COV-2 AG Positive test for SARS-CoV-2 (antigen detected) (A) Presumptive negative test for SARS-CoV-2 (no antigen detected)      No results found.    Diagnostic study results (if any) were reviewed by Veto Story PA-C.    Assessment/Plan   Allergies, medications, history, and pertinent labs/EKGs/Imaging reviewed by Veto Story PA-C.     Medical Decision Making  Patient tested positive in office for COVID did go over CDC guidelines and recommendations also did discuss contagiousness.  Patient will be placed on Medrol Dosepak and Tessalon Perles to help  with symptomology.  As patient recently ill patient on will also be sent a prescription for doxycycline I did advise patient that COVID is a viral illness and that if gets signs of pneumonia she is to start doxycycline, may return to urgent care for evaluation if no resolution or regression of symptoms in 5 to 7 days.  Patient verbalized understanding and is agreeable to plan discharge emergent care A+O x 4 stable condition no signs of distress    Orders and Diagnoses  Diagnoses and all orders for this visit:  Exposure to COVID-19 virus  -     POCT Covid-19 Rapid Antigen      Medical Admin Record      Patient disposition: Home    Electronically signed by Veto Story PA-C  2:20 PM

## 2024-10-29 ENCOUNTER — TELEMEDICINE (OUTPATIENT)
Dept: HEMATOLOGY/ONCOLOGY | Facility: CLINIC | Age: 44
End: 2024-10-29
Payer: COMMERCIAL

## 2024-10-29 DIAGNOSIS — R76.0 ANTIPHOSPHOLIPID ANTIBODY POSITIVE: ICD-10-CM

## 2024-10-29 DIAGNOSIS — R76.0 ANTIPHOSPHOLIPID ANTIBODY POSITIVE: Primary | ICD-10-CM

## 2024-10-29 PROCEDURE — 99213 OFFICE O/P EST LOW 20 MIN: CPT | Performed by: INTERNAL MEDICINE

## 2024-11-25 ENCOUNTER — TELEPHONE (OUTPATIENT)
Dept: HEMATOLOGY/ONCOLOGY | Facility: CLINIC | Age: 44
End: 2024-11-25
Payer: COMMERCIAL

## 2024-11-25 NOTE — TELEPHONE ENCOUNTER
Spoke with the patient. Had gum surgery/gum grafting a couple weeks ago and had regimen planned for how to go off and back  on Xarelto. States she could not take any anti-inflammatories after her gum surgery. States her gums/face swelled a lot after the surgery- OK now. Pt needs to have a 2nd surgery in the spring (not scheduled yet) and wondering if there is something she could plan to take to prevent this with the 2nd surgery. Pt asking if Dr. Pedroza thinks being on a steroid would help? Would she prescribe it before her next surgery, if yes. Explained I would update Dr. Pedroza and then anna her back once I receive a response.

## 2024-11-25 NOTE — TELEPHONE ENCOUNTER
Patient had recent gum surgery.  Went off and back on her Xarelto, but suffered bad inflammation from it.  Knows she cannot take anti-inflammatory.  Has to schedule second part to surgery, but is hesitant to schedule because of this.  Wondering if Dr. Pedroza would call her in a steroid?  Ok to take to help with inflammation?

## 2024-11-25 NOTE — TELEPHONE ENCOUNTER
Spoke with the patient. Provided update from Dr. Pedroza. Pt verbalized understanding and states she will reach out to the office again prior to her next surgery & once she discusses with her oral surgeon.

## 2024-12-23 ENCOUNTER — PATIENT MESSAGE (OUTPATIENT)
Dept: PRIMARY CARE | Facility: CLINIC | Age: 44
End: 2024-12-23
Payer: COMMERCIAL

## 2024-12-23 DIAGNOSIS — M54.32 SCIATICA OF LEFT SIDE: Primary | ICD-10-CM

## 2024-12-26 ENCOUNTER — TELEPHONE (OUTPATIENT)
Dept: PRIMARY CARE | Facility: CLINIC | Age: 44
End: 2024-12-26
Payer: COMMERCIAL

## 2025-01-16 ENCOUNTER — APPOINTMENT (OUTPATIENT)
Dept: HEMATOLOGY/ONCOLOGY | Facility: CLINIC | Age: 45
End: 2025-01-16
Payer: COMMERCIAL

## 2025-01-27 ENCOUNTER — APPOINTMENT (OUTPATIENT)
Dept: ORTHOPEDIC SURGERY | Facility: CLINIC | Age: 45
End: 2025-01-27
Payer: COMMERCIAL

## 2025-02-02 ENCOUNTER — PATIENT MESSAGE (OUTPATIENT)
Dept: PRIMARY CARE | Facility: CLINIC | Age: 45
End: 2025-02-02
Payer: COMMERCIAL

## 2025-02-02 DIAGNOSIS — L40.9 SCALP PSORIASIS: Primary | ICD-10-CM

## 2025-02-03 ENCOUNTER — APPOINTMENT (OUTPATIENT)
Dept: ALLERGY | Facility: CLINIC | Age: 45
End: 2025-02-03
Payer: COMMERCIAL

## 2025-02-03 VITALS
BODY MASS INDEX: 36.16 KG/M2 | HEIGHT: 66 IN | TEMPERATURE: 98.1 F | RESPIRATION RATE: 17 BRPM | SYSTOLIC BLOOD PRESSURE: 116 MMHG | OXYGEN SATURATION: 97 % | HEART RATE: 87 BPM | DIASTOLIC BLOOD PRESSURE: 74 MMHG | WEIGHT: 225 LBS

## 2025-02-03 DIAGNOSIS — R09.81 NASAL CONGESTION: ICD-10-CM

## 2025-02-03 DIAGNOSIS — J32.9 CHRONIC RHINOSINUSITIS: Primary | ICD-10-CM

## 2025-02-03 PROCEDURE — 99244 OFF/OP CNSLTJ NEW/EST MOD 40: CPT | Performed by: ALLERGY & IMMUNOLOGY

## 2025-02-03 PROCEDURE — 95004 PERQ TESTS W/ALRGNC XTRCS: CPT | Performed by: ALLERGY & IMMUNOLOGY

## 2025-02-03 RX ORDER — CALCIPOTRIENE 0.05 MG/ML
SOLUTION TOPICAL
Qty: 60 ML | Refills: 5 | Status: SHIPPED | OUTPATIENT
Start: 2025-02-03

## 2025-02-03 NOTE — PROGRESS NOTES
Patient ID: Sakshi Kahn is a 44 y.o. female.     Chief Complaint: NPV referred by Heike Gonzalez NP  History Of Present Illness  Sakshi Kahn is a 44 y.o. female with PMx history of allergic rhinitis presenting for allergic rhinitis.     Food Allergy  No    Eczema/ Atopic Dermatitis  No  She has mild scalp psoriasis. For this she uses coal tar shampoo.  Has not seen derm for this.    Asthma  No    Rhinoconjunctivitis  Spring a fall problematic symptoms of rhinorrhea and congestion  She takes prn allergy medications. Recently has tried pseudofed and mucinex for throat congestion and sinus headache.  She feels the achining under the eyes.  Currently not on antihistamines or nasal sprays. Has never regularly used her nasal spray.    Drug Allergy   NSAID-does not take due to anti phospholipid antibody syndrome.    Insect Allergy   No    Infections  Anitbotics about two times a year for sinusitis in the spring and fall.  Takes Xarelto for antiphospholipid antibody syndrome and sees Dr. Pedroza      Review of Systems    Pertinent positives and negatives have been assessed in the HPI. All other systems have been reviewed and are negative except as noted in the HPI.    Allergies  Aspirin, Contrave [naltrexone-bupropion], Ibuprofen, and Nsaids (non-steroidal anti-inflammatory drug)    Past Medical History  She has a past medical history of Clotting disorder (Multi) (antiphospholipid antibody syndrome; diagnosed in 2020), Other specified health status, Personal history of other infectious and parasitic diseases (02/18/2020), and Personal history of other venous thrombosis and embolism (02/18/2020).    Family History  Family History   Problem Relation Name Age of Onset    Atrial fibrillation Mother Kimberly Desouza     No Known Problems Father      Other (Cerebrovascular accident) Maternal Grandmother Leta Araiza     Stroke Maternal Grandmother Leta Araiza     Other (cerebrovascular accident) Other cousin      Long QT syndrome Other cousin     Psoriasis Other         Parents both have allergic rhinitis.    Surgical History  She has a past surgical history that includes Other surgical history (02/18/2020); Other surgical history (02/18/2020); Adenoidectomy (1983); and Back surgery (2013).   Tonsils and adenoids removed as a child.  Social/Environmental History  She reports that she has never smoked. She has never used smokeless tobacco. She reports current alcohol use of about 2.0 - 3.0 standard drinks of alcohol per week. She reports that she does not use drugs.    Home: Lives in a house   Floors: Wood and carpeting mixed  Air Conditioning: Central  Smoker: No  Pets: No  Infestations: No  Molds: No  Occupation: social work, staffing for non profit    MEDICATIONS  Current Outpatient Medications on File Prior to Visit   Medication Sig Dispense Refill    calcipotriene 0.005 % ointment Scalp solution not ointment. Apply it at night to your scalp and cover with a shower cap. Leave it on overnight. Do not get it in your eyes. 60 mL 5    cholecalciferol (Vitamin D-3) 50 mcg (2,000 unit) capsule Take 1 capsule (50 mcg) by mouth once daily.      docosahexaenoic acid/epa (FISH OIL ORAL) Take 1 capsule by mouth in the morning.      levonorgestrel (Mirena) 21 mcg/24 hours (8 yrs) 52 mg IUD 52 mg by intrauterine route 1 time.      magnesium oxide (Mag-Ox) 400 mg tablet Take 1 tablet (400 mg) by mouth once daily.      multivitamin tablet Take 1 tablet by mouth once daily.      rivaroxaban (Xarelto) 20 mg tablet Take 1 tablet (20 mg) by mouth once daily. Take with food. 90 tablet 2    orphenadrine (Norflex) 100 mg 12 hr tablet Take 1 tablet (100 mg) by mouth 2 times a day as needed for muscle spasms or mild pain (1 - 3). Do not crush, chew, or split. 60 tablet 5    predniSONE (Deltasone) 20 mg tablet 3 tabs daily x3d then 2 tabs daily x 3d then 1 tab daily x 3d then 1/2 tab daily x 3d with food 20 tablet 0     No current  "facility-administered medications on file prior to visit.         Physical Exam  Visit Vitals  /74   Pulse 87   Temp 36.7 °C (98.1 °F) (Temporal)   Resp 17   Ht 1.676 m (5' 6\")   Wt 102 kg (225 lb)   SpO2 97%   BMI 36.32 kg/m²   OB Status Having periods   Smoking Status Never   BSA 2.18 m²       Wt Readings from Last 1 Encounters:   02/03/25 102 kg (225 lb)       Physical Exam    General: Well appearing, no acute distress  Head: Normocephalic, atraumatic, neck supple without lymphadenopathy  Eyes: EOMI, non-injected, dark puffy circles under the eyes  Ears: TM's normal  Nose: No nasal crease, nares patent, minimal discharge  Throat: Normal dentition, no erythema, tonsils removed  Heart: Regular rate and rhythm  Lungs: Clear to auscultation bilaterally, effort normal  Abdomen: Soft, non-tender, normal bowel sounds  Extremities: Moves all extremities symmetrically, no edema  Skin: No rashes/lesions  Psych: normal mood and affect    LAB RESULTS:  CBC:  Recent Labs     01/18/23  0858 05/04/22  1515 02/02/22  1328   WBC 5.5 6.9 6.0   HGB 14.1 13.5 10.2*   HCT 42.2 41.4 32.9*    235 261   MCV 97 89 93   EOSABS 0.26 0.09 0.09       CMP:  Recent Labs     12/02/19  1358      K 3.9      CO2 25   ANIONGAP 11   BUN 17   CREATININE 0.80     Recent Labs     12/02/19  1358   ALBUMIN 4.2   ALKPHOS 49   ALT 16   AST 20   BILITOT 0.4     Recent Labs     01/18/23  0858 05/04/22  1515 02/02/22  1328   EOSABS 0.26 0.09 0.09         ABO:   Recent Labs     06/13/19  1630   ABO A       HEME/ENDO:  Recent Labs     05/04/22  1515 02/28/20  1210   TSH  --  1.63   FERRITIN 12  --    IRONSAT 11*  --      Allergy skin tests reviewed and scanned.    Assessment/Plan     Sakshi is a 45 yo woman with a history of chronic rhinitis, antiphospholipid antibody syndrome and scalp psoriasis.  -allergy skin tests for environmental allergy are negative today  -we discussed obtaining additional labs and I will call results  -Follow up " to be determined pending labs.    Melissa Pace,

## 2025-02-03 NOTE — PATIENT INSTRUCTIONS
Allergy test are negative to cat, dog, dust mite, feather, cockroach, molds and pollens.     Obtain labs and I will call results

## 2025-02-04 ENCOUNTER — TELEMEDICINE (OUTPATIENT)
Dept: HEMATOLOGY/ONCOLOGY | Facility: CLINIC | Age: 45
End: 2025-02-04
Payer: COMMERCIAL

## 2025-02-04 DIAGNOSIS — R76.0 ANTIPHOSPHOLIPID ANTIBODY POSITIVE: Primary | ICD-10-CM

## 2025-02-04 PROCEDURE — 99213 OFFICE O/P EST LOW 20 MIN: CPT | Performed by: INTERNAL MEDICINE

## 2025-02-04 NOTE — PROGRESS NOTES
Patient ID: Sakshi Kahn is a 44 y.o. female.    Subjective    HPI  MsBrock Kahn is a 44 y/o F with history of a provoked DVT who presents for her annual follow-up for DVT and antiphospholipid syndrome. Currently on Xarelto.     Since last visit, patient reports that she continues to tolerate the Xarelto well with no new side effects.  She is planned for her final gum surgery and March.  Performance Status:  Symptomatic; fully ambulatory    Labs/Imaging/Pathology: personally reviewed reports and images in Epic electronic medical record system. Pertinent results as it related to the plan represented in below in assessment and plan.      Assessment/Plan   1. Antiphospholipid syndrome, postive anticardiolipin and [id number] IgM x2  2. History of provoked RLE DVT  3. Planned for in-vitro fertilization      - Underwent screening for thrombophilia during initial visit 12/2/19  - positive for anticardiolipin IgM and beta2 glycoprotein IgM antibodies x2. She did also have a positive lupus anticoagulant however it was gone 12 weeks later  - Discussed  with patient the [place] criteria for antiphospholipid syndrome which includes meeting at least one of the clinical criteria (a vascular thrombosis or pregnancy morbidity) along with at least one of the laboratory criteria which includes positive anticardiolipin  or beta-2 glycoprotein antibodies or a positive lupus anticoagulant  -  Given IgM was persistently elevated x2 she does meet criteria  - discussed that DOACs are thought to be inferior to warfarin however this data is strongest for triple positive disease-  - due to pregnancy placed on lovenox  - 2/2022 6 weeks post partum resumed on xarelto due to pt preference    10/29/24  - tolerated xarelto fine  - planning for gum surgery and requested to stay on xarelto but now open going to warfarin after surgery   - recommend in preparation for surgery, hold xarelto 2 days prior and resume day after  surgery  - for any upcoming procedures when on warfarin in preparation for her surgery, recommend lovenox bridging, where she should hold warfarin 5 days prior and start lovenox 100mg subcutaneous q12 h until two days prior to surgery. Then should resume warfarin day after surgery and continue lovenox until INR is therapeutic 2-3  - however wishes to stay on xarelto for now until procedures are completed  - will continue xarelto for now  - RTC in March and will discuss warfarin.  This note has been transcribed using a medical scribe and there is a possibility of unintentional typing misprints.     2/7/25  -Patient is planned for her final surgery next month and we discussed that she can hold on restarting her Xarelto for 2 to 3 days post operation to allow for the use of NSAIDs    -During her last surgery she started her Xarelto the day after and could not take NSAIDs which led to increased swelling and and prolonged healing  -Return to clinic in 3 months for consideration of starting warfarin    Antiphospholipid antibody positive  -     enoxaparin (Lovenox) 100 mg/mL syringe; Inject 1 mL (100 mg) under the skin every 12 hours. As described in office: lovenox 100 mg on 2/16/24 every 12 h, last dose 2/18/24 in AM (no PM dose)  - on day of colonoscopy 2/19/24 no blood thinners in AM, resume one dose of lovenox that evening  -     Clinic Appointment Request; Future    Caityln Pedroza MD  Hematology/Oncology  [facility] at [facility]    Scribe Attestation  By signing my name below, Ade SORIANO Scribe attest that this documentation has been prepared under the direction and in the presence of Caitlyn Pedroza MD.     20 min spent on this encounter

## 2025-02-07 ENCOUNTER — PATIENT MESSAGE (OUTPATIENT)
Dept: PRIMARY CARE | Facility: CLINIC | Age: 45
End: 2025-02-07
Payer: COMMERCIAL

## 2025-02-07 DIAGNOSIS — R79.89 ELEVATED CORTISOL LEVEL: Primary | ICD-10-CM

## 2025-02-08 LAB
A ALTERNATA IGE QN: <0.1 KU/L
A ALTERNATA IGE RAST: 0
A FUMIGATUS IGE QN: <0.1 KU/L
A FUMIGATUS IGE RAST: 0
BASOPHILS # BLD AUTO: 68 CELLS/UL (ref 0–200)
BASOPHILS NFR BLD AUTO: 0.8 %
BERMUDA GRASS IGE QN: <0.1 KU/L
BERMUDA GRASS IGE RAST: 0
BOXELDER IGE QN: <0.1 KU/L
BOXELDER IGE RAST: 0
C HERBARUM IGE QN: <0.1 KU/L
C HERBARUM IGE RAST: 0
CALIF WALNUT POLN IGE QN: <0.1 KU/L
CALIF WALNUT POLN IGE RAST: 0
CAT DANDER IGE QN: <0.1 KU/L
CAT DANDER IGE RAST: 0
CH50 SERPL-ACNC: 60 U/ML (ref 31–60)
CMN PIGWEED IGE QN: <0.1 KU/L
CMN PIGWEED IGE RAST: 0
COMMON RAGWEED IGE QN: <0.1 KU/L
COMMON RAGWEED IGE RAST: 0
COTTONWOOD IGE QN: <0.1 KU/L
COTTONWOOD IGE RAST: 0
D FARINAE IGE QN: <0.1 KU/L
D FARINAE IGE RAST: 0
D PTERONYSS IGE QN: <0.1 KU/L
D PTERONYSS IGE RAST: 0
DOG DANDER IGE QN: <0.1 KU/L
DOG DANDER IGE RAST: 0
EOSINOPHIL # BLD AUTO: 247 CELLS/UL (ref 15–500)
EOSINOPHIL NFR BLD AUTO: 2.9 %
ERYTHROCYTE [DISTWIDTH] IN BLOOD BY AUTOMATED COUNT: 11.1 % (ref 11–15)
HCT VFR BLD AUTO: 46.2 % (ref 35–45)
HGB BLD-MCNC: 15 G/DL (ref 11.7–15.5)
IGA SERPL-MCNC: 162 MG/DL (ref 47–310)
IGE SERPL-ACNC: 8 KU/L
IGG SERPL-MCNC: 1110 MG/DL (ref 600–1640)
IGM SERPL-MCNC: 92 MG/DL (ref 50–300)
LONDON PLANE IGE QN: <0.1 KU/L
LONDON PLANE IGE RAST: 0
LYMPHOCYTES # BLD AUTO: 1768 CELLS/UL (ref 850–3900)
LYMPHOCYTES NFR BLD AUTO: 20.8 %
MCH RBC QN AUTO: 32.3 PG (ref 27–33)
MCHC RBC AUTO-ENTMCNC: 32.5 G/DL (ref 32–36)
MCV RBC AUTO: 99.4 FL (ref 80–100)
MONOCYTES # BLD AUTO: 714 CELLS/UL (ref 200–950)
MONOCYTES NFR BLD AUTO: 8.4 %
MOUSE URINE PROT IGE QN: <0.1 KU/L
MOUSE URINE PROT IGE RAST: 0
MT JUNIPER IGE QN: <0.1 KU/L
MT JUNIPER IGE RAST: 0
NEUTROPHILS # BLD AUTO: 5704 CELLS/UL (ref 1500–7800)
NEUTROPHILS NFR BLD AUTO: 67.1 %
P NOTATUM IGE QN: <0.1 KU/L
P NOTATUM IGE RAST: 0
PECAN/HICK TREE IGE QN: <0.1 KU/L
PECAN/HICK TREE IGE RAST: 0
PLATELET # BLD AUTO: 301 THOUSAND/UL (ref 140–400)
PMV BLD REES-ECKER: 9.5 FL (ref 7.5–12.5)
RBC # BLD AUTO: 4.65 MILLION/UL (ref 3.8–5.1)
REF LAB TEST REF RANGE: NORMAL
ROACH IGE QN: <0.1 KU/L
ROACH IGE RAST: 0
S PN DA SERO 19F IGG SER-MCNC: 0.4 UG/ML
S PNEUM DA 1 IGG SER-MCNC: 0.7 UG/ML
S PNEUM DA 10A IGG SER-MCNC: <0.3 UG/ML
S PNEUM DA 11A IGG SER-MCNC: <0.3 UG/ML
S PNEUM DA 12F IGG SER-MCNC: <0.3 UG/ML
S PNEUM DA 14 IGG SER-MCNC: 0.5
S PNEUM DA 15B IGG SER-MCNC: 0.5 UG/ML
S PNEUM DA 17F IGG SER-MCNC: <0.3 UG/ML
S PNEUM DA 18C IGG SER-MCNC: 6.4
S PNEUM DA 19A IGG SER-MCNC: 4.6 UG/ML
S PNEUM DA 2 IGG SER-MCNC: 0.7 UG/ML
S PNEUM DA 20A IGG SER-MCNC: 5 UG/ML
S PNEUM DA 22F IGG SER-MCNC: 1.1 UG/ML
S PNEUM DA 23F IGG SER-MCNC: 0.4 UG/ML
S PNEUM DA 3 IGG SER-MCNC: 0.4 UG/ML
S PNEUM DA 33F IGG SER-MCNC: >47 UG/ML
S PNEUM DA 4 IGG SER-MCNC: <0.3 UG/ML
S PNEUM DA 5 IGG SER-MCNC: 0.3 UG/ML
S PNEUM DA 6B IGG SER-MCNC: 0.7 UG/ML
S PNEUM DA 7F IGG SER-MCNC: 2.8 UG/ML
S PNEUM DA 8 IGG SER-MCNC: 0.3 UG/ML
S PNEUM DA 9N IGG SER-MCNC: <0.3 UG/ML
S PNEUM DA 9V IGG SER-MCNC: <0.3 UG/ML
SALTWORT IGE QN: <0.1 KU/L
SALTWORT IGE RAST: 0
SHEEP SORREL IGE QN: <0.1 KU/L
SHEEP SORREL IGE RAST: 0
SILVER BIRCH IGE QN: <0.1 KU/L
SILVER BIRCH IGE RAST: 0
TIMOTHY IGE QN: <0.1 KU/L
TIMOTHY IGE RAST: 0
WBC # BLD AUTO: 8.5 THOUSAND/UL (ref 3.8–10.8)
WHITE ASH IGE QN: <0.1 KU/L
WHITE ASH IGE RAST: 0
WHITE ELM IGE QN: <0.1 KU/L
WHITE ELM IGE RAST: 0
WHITE MULBERRY IGE QN: <0.1 KU/L
WHITE MULBERRY IGE RAST: 0
WHITE OAK IGE QN: <0.1 KU/L
WHITE OAK IGE RAST: 0

## 2025-02-11 ENCOUNTER — APPOINTMENT (OUTPATIENT)
Dept: ORTHOPEDIC SURGERY | Facility: CLINIC | Age: 45
End: 2025-02-11
Payer: COMMERCIAL

## 2025-02-11 ENCOUNTER — HOSPITAL ENCOUNTER (OUTPATIENT)
Dept: RADIOLOGY | Facility: CLINIC | Age: 45
Discharge: HOME | End: 2025-02-11
Payer: COMMERCIAL

## 2025-02-11 DIAGNOSIS — M54.16 LUMBAR RADICULITIS: ICD-10-CM

## 2025-02-11 DIAGNOSIS — M47.816 LUMBAR SPONDYLOSIS: ICD-10-CM

## 2025-02-11 DIAGNOSIS — M54.32 SCIATICA OF LEFT SIDE: ICD-10-CM

## 2025-02-11 DIAGNOSIS — M47.816 LUMBAR SPONDYLOSIS: Primary | ICD-10-CM

## 2025-02-11 PROCEDURE — 1036F TOBACCO NON-USER: CPT | Performed by: PHYSICAL MEDICINE & REHABILITATION

## 2025-02-11 PROCEDURE — 99243 OFF/OP CNSLTJ NEW/EST LOW 30: CPT | Performed by: PHYSICAL MEDICINE & REHABILITATION

## 2025-02-11 PROCEDURE — 72100 X-RAY EXAM L-S SPINE 2/3 VWS: CPT

## 2025-02-11 NOTE — PROGRESS NOTES
New Consult/New Patient Note    2/11/2025   Heike Gonzalez, AP*    Assessment: Very pleasant 44-year-old female with now improving lower back and left groin pain.  Intermittent paresthesia to the left foot and ankle.  Denies any worsening neurological deficits.  History of laminectomy in 2013 at [facility].  -Lumbar spondylosis with improving lumbar radiculitis    PLAN:  1)  Imaging/Diagnostic Studies: Obtain updated lumbar x-ray to further assess given her somewhat persistent symptoms and history of laminectomy.  MRI if symptoms persist or worsen  2)  Therapy/Rehabilitation: Completed continues her home exercise program  3)  Pharmacological Management: No new at this time.  Discussed potential trial of Lyrica  4)  Spine/Surgical Interventions: None at this time  5)  Alternative Treatments: May consider alternative treatment options in the future including manipulation (chiropractor versus osteopathic) and/or acupuncture if patient does not obtain optimal relief with initial treatment plan.  6)  Consultations:  None at this time  7)  Follow -up: 4-6 weeks or PRN if symptoms worsen/do not improve.   8)  Future treatment considerations: Trial of Lyrica, MRI to further assess    Patient advised of the difference between hurt and harm and advised to continue with all normal activities and exercises. Patient verbalized understanding of the above plan and was happy with the care provided.      The above clinical summary has been dictated with voice recognition software. It has not been proofread for grammatical errors, typographical mistakes, or other semantic inconsistencies.    Thank you for visiting our office today. It was our pleasure to take part in your healthcare.     Do not hesitate to call with any questions regarding your plan of care after leaving at [phone]    To clinicians, thank you very much for this kind referral. It is a privilege to partner with you in the care of your patients. My office would  be delighted to assist you with any further consultations or with questions regarding the plan of care outlined. Do not hesitate to call the office or contact me directly.     Sincerely,    CELE Ritchie MD  , Physical Medicine and Rehabilitation, Orthopedic Spine  [facility]  [facility] [place]         Sakshi Kahn   is a 44 y.o. female who presents with worsening pain at the lower back and left groin.    -HX of laminectomy in 2013 - [facility]  Location:  Left groin, bilateral lower back   Radiation:  some tingling and paraesthesia into the left foot.  Denies any sig. Radicular pain.  No worsening weakness.  Some chronic weakness in bilat thighs.    Quality: tingling, achy  current 1/10,  at its worst 3/10  Exacerbated by sitting for long periods  Relieved by rest and stretching, yoga  Onset, traumatic event:  no recent   Has tried:  PT with HEP, was in PT from Sept. To mid January of 2025.      Valsalva sign is neg  Grocery cart sign is neg  Smoker:  no  Does not wake them at night  Litigation: none    Patient denies bowel/bladder incontinence, denies fever, denies unintentional weight loss, denies clumsiness of hands, feet, or dropping things.  Denies any constitutional or myelopathic symptomatology.      PREVIOUS TREATMENTS  IN THE LAST SIX MONTHS     Active conservative therapy  in the last six months (see below)              1. Physical therapy:  yes                                                                                 2. Home exercise program after PT:  yes                                                    3. A physician supervised home exercise program (HEP):  yes               4. Chiropractic Care:  yes                                                                    Passive conservative therapy  in the last six months (see below)              1. NSAIDS:   avoiding due to xarelto                                                                                                          2. Prescription pain medication:                                                              3. Acupuncture:                                                                                             4. Tens unit:      Assistive Devices: none    Work status:  mostly desk work       ROS: Other than listed in HPI, PMHX below, and intake paperwork including a 30 point patient-recorded review of symptoms which was personally reviewed and inclusive of no history of unintentional weight loss, change in appetite, significant malaise, fevers, chills, or change in bowel/bladder, shortness of breath, or chest pain.    I have confirmed and edited as necessary Past Medical, Past Surgical, Family, Social History and ROS as obtained by others. These were also obtained on new patient forms.      PHYSICAL EXAM:   GENERAL APPEARANCE:  Well nourished, well developed, and no apparent distress.  NEURO PSYCH: Patient oriented to person, place, Mood pleasant. Benign affect.  MUSCULOSKELETAL and NEUROLOGICAL       VISUAL INSPECTION           LUMBAR: WNL  SPINE ROM:   LUMBAR ROM: Full      PALPATION:           SPINOUS PROCESS: Nontender midline lumbar           PARASPINALS: Mild tenderness bilateral lower lumbar  FACET LOADING: Minimally positive bilateral lower lumbar  MUSCLE BULK: Normal and symmetrical in the upper & lower extremities.  MUSCLE TONE: Normal  MOTOR: 5/5 in all muscle groups tested in bilateral lower extremities   SENSORY: Normal sensory exam to light touch in the lower extremities  GAIT: Normal.  Able to go up and heels and toes with no sig. weakness.  No sig. balance deficit appreciated  Slump testing negative  PERIPHERAL JOINT ROM:   HIP ROM: Full bilaterally  KAYLEEN/Thigh Thrust/Compression/Leslye Finger:  Negative bilaterally   Hip Exam including thigh thrust and LOG ROLL: Negative bilaterally    DATA REVIEW:   The below imaging studies were  personally reviewed and discussed with the patient.    Medical Decision Making:  The above note constitutes a Moderate to High level of medical decision making based on past data and imaging review, new and chronic symptoms with exacerbation, change in weakness or sensation, new imaging and diagnostic studies ordered, discussion of potential interventional or surgical treatment options, acute or chronic pain that may pose a threat to bodily function.    Past Medical History:   Diagnosis Date    Clotting disorder (Multi) antiphospholipid antibody syndrome; diagnosed in 2020    Other specified health status     No pertinent past medical history    Personal history of other infectious and parasitic diseases 02/18/2020    History of gonorrhea    Personal history of other venous thrombosis and embolism 02/18/2020    History of deep venous thrombosis       Medication Documentation Review Audit       Reviewed by Aaron Ritchie MD (Physician) on 02/11/25 at 0812      Medication Order Taking? Sig Documenting Provider Last Dose Status   calcipotriene 0.005 % ointment [id number]  Scalp solution not ointment. Apply it at night to your scalp and cover with a shower cap. Leave it on overnight. Do not get it in your eyes. Heike Gonzalez, APRN-CNP  Active   cholecalciferol (Vitamin D-3) 50 mcg (2,000 unit) capsule [id number] No Take 1 capsule (50 mcg) by mouth once daily. Historical Provider, MD Taking Active   docosahexaenoic acid/epa (FISH OIL ORAL) [id number] No Take 1 capsule by mouth in the morning. Historical MD Amber Taking Active   levonorgestrel (Mirena) 21 mcg/24 hours (8 yrs) 52 mg IUD [id number] No 52 mg by intrauterine route 1 time. Historical MD Amber Taking Active   magnesium oxide (Mag-Ox) 400 mg tablet [id number] No Take 1 tablet (400 mg) by mouth once daily. Rebekah Clark MD Taking Active   multivitamin tablet [id number] No Take 1 tablet by mouth once daily. Historical MD Amber Taking Active      Discontinued 02/07/25 1054     Discontinued 02/07/25 1054   rivaroxaban (Xarelto) 20 mg tablet [id number] No Take 1 tablet (20 mg) by mouth once daily. Take with food. Caitlyn Pedroza MD Taking Active                    Allergies   Allergen Reactions    Aspirin Other    Contrave [Naltrexone-Bupropion] Nausea/vomiting     Brain fog    Ibuprofen Bleeding    Nsaids (Non-Steroidal Anti-Inflammatory Drug) Bleeding       Social History     Socioeconomic History    Marital status:      Spouse name: Not on file    Number of children: Not on file    Years of education: Not on file    Highest education level: Not on file   Occupational History    Occupation: former clinical director of [employer]   Tobacco Use    Smoking status: Never    Smokeless tobacco: Never   Substance and Sexual Activity    Alcohol use: Yes     Alcohol/week: 2.0 - 3.0 standard drinks of alcohol     Types: 2 - 3 Glasses of wine per week     Comment: 2-3 a wk    Drug use: Never    Sexual activity: Yes     Partners: Male     Birth control/protection: I.U.D.   Other Topics Concern    Not on file   Social History Narrative    Not on file     Social Drivers of Health     Financial Resource Strain: Not on file   Food Insecurity: Not on file   Transportation Needs: Not on file   Physical Activity: Not on file   Stress: Not on file   Social Connections: Not on file   Intimate Partner Violence: Not on file   Housing Stability: Not on file       Past Surgical History:   Procedure Laterality Date    ADENOIDECTOMY  1983    BACK SURGERY  2013    OTHER SURGICAL HISTORY  02/18/2020    Tonsillectomy with adenoidectomy    OTHER SURGICAL HISTORY  02/18/2020    Lumbar laminectomy

## 2025-02-27 DIAGNOSIS — R76.0 ANTIPHOSPHOLIPID ANTIBODY POSITIVE: ICD-10-CM

## 2025-03-07 ENCOUNTER — PATIENT MESSAGE (OUTPATIENT)
Dept: PRIMARY CARE | Facility: CLINIC | Age: 45
End: 2025-03-07
Payer: COMMERCIAL

## 2025-03-07 DIAGNOSIS — F51.01 PRIMARY INSOMNIA: Primary | ICD-10-CM

## 2025-03-07 RX ORDER — TRAZODONE HYDROCHLORIDE 50 MG/1
25-100 TABLET ORAL NIGHTLY PRN
Qty: 60 TABLET | Refills: 2 | Status: SHIPPED | OUTPATIENT
Start: 2025-03-07 | End: 2026-03-07

## 2025-03-18 ENCOUNTER — APPOINTMENT (OUTPATIENT)
Dept: HEMATOLOGY/ONCOLOGY | Facility: CLINIC | Age: 45
End: 2025-03-18
Payer: COMMERCIAL

## 2025-03-29 DIAGNOSIS — F51.01 PRIMARY INSOMNIA: ICD-10-CM

## 2025-03-31 RX ORDER — TRAZODONE HYDROCHLORIDE 50 MG/1
TABLET ORAL
Qty: 180 TABLET | Refills: 2 | Status: SHIPPED | OUTPATIENT
Start: 2025-03-31

## 2025-04-03 ASSESSMENT — PROMIS GLOBAL HEALTH SCALE
RATE_PHYSICAL_HEALTH: GOOD
RATE_AVERAGE_FATIGUE: MODERATE
CARRYOUT_SOCIAL_ACTIVITIES: VERY GOOD
RATE_QUALITY_OF_LIFE: VERY GOOD
EMOTIONAL_PROBLEMS: OFTEN
CARRYOUT_PHYSICAL_ACTIVITIES: COMPLETELY
RATE_GENERAL_HEALTH: VERY GOOD
RATE_MENTAL_HEALTH: GOOD
RATE_SOCIAL_SATISFACTION: GOOD
RATE_AVERAGE_PAIN: 3

## 2025-04-06 PROBLEM — R09.81 NASAL CONGESTION: Status: RESOLVED | Noted: 2024-10-08 | Resolved: 2025-04-06

## 2025-04-06 PROBLEM — J32.9 CHRONIC RHINOSINUSITIS: Status: ACTIVE | Noted: 2025-04-06

## 2025-04-06 PROBLEM — R40.0 DAYTIME SLEEPINESS: Status: RESOLVED | Noted: 2024-04-25 | Resolved: 2025-04-06

## 2025-04-06 ASSESSMENT — ENCOUNTER SYMPTOMS
UNEXPECTED WEIGHT CHANGE: 0
DIZZINESS: 0
EYE DISCHARGE: 0
DYSURIA: 0
POLYPHAGIA: 0
TROUBLE SWALLOWING: 0
CHILLS: 0
POLYDIPSIA: 0
EYE REDNESS: 0
HEMATURIA: 0
FREQUENCY: 0
SORE THROAT: 0
BLOOD IN STOOL: 0
FATIGUE: 0
BACK PAIN: 0
BRUISES/BLEEDS EASILY: 0
WOUND: 0
COUGH: 0
HEADACHES: 0
NECK PAIN: 0
APPETITE CHANGE: 0
FEVER: 0
ABDOMINAL PAIN: 0
ADENOPATHY: 0
HALLUCINATIONS: 0
CONFUSION: 0
EYE PAIN: 0
PALPITATIONS: 0
SHORTNESS OF BREATH: 0
VOMITING: 0

## 2025-04-06 NOTE — PROGRESS NOTES
Subjective   Patient ID: Sakshi Kahn is a 44 y.o. female who presents for Annual Exam.    Is pt fasting? yes   Any other providers besides below:  hui Cunningham, neno, ezequiel/judd, ruba gonzales, raul fields, joel, cara    Is pt taking vit d 2000iu 1 a day consistently? Yes but this past month has been not as consistent as mom passed *away   Last time pt had a migraine?  Can't remember, its been a while   *Using cpap nightly? No, has new mask to try; says msc is hard to work with  Was last mammogram in 2023? She thought she had one last year, is asking octavio for referral for it -has exam in june  Any concerns pt wants to discuss today?    No     Phq9=5  ,  gad7=3  Need nie[facility] dr cunningham last pap    HPI  Last labs:2/2025 cbc normal  10/2024 Trigs normal  Hdl slightly low at 48. Goal >50 for women. Incr exercise.  Ldl high at 130. Goal <100. Decr fats and incr fiber.  Last time 128.  Vitamin d normal.  Continue otc vitamin d.  3/2024 ldl 128  12/2023 Trigs and hdl normal.  Ldl high at 133. Goal <100. Decr fats and incr fiber. Last time it was 123. We can discuss this more at your appt.  Sugar (aka glucose), kidney function, liver function and electrolytes in the CMP (comprehensive metabolic panel) were normal.  TSH (thyroid test) was normal.  CBC (complete blood count) was normal which looks at infection and anemia markers.  1/2023 cmp nl, tsh nl, lipid ldl 123, vit d low at 27  Labs due-all except cbc    Immunization History   Administered Date(s) Administered    Flu vaccine (IIV4), preservative free *Check age/dose* 12/10/2020    Influenza, injectable, quadrivalent 02/20/2025    Influenza, seasonal, injectable 10/13/2021    Moderna SARS-CoV-2 Vaccination 11/18/2021    Pfizer Purple Cap SARS-CoV-2 03/21/2021, 04/11/2021    Pneumococcal Conjugate PCV 7 1978    Tdap vaccine, age 7 year and older (BOOSTRIX, ADACEL) 06/12/2015, 03/31/2016, 10/13/2021      Other concerns: wants to  follow up with mom's afib dr to see if need to be proactive  Mom  recently in shower-not sure if mi or stroke  Using trazodone on off    Working with health -cortisol high am and even higher pm; did not take supplement yet; endo is scheduled    ER/urgicare visits in the last year- 10/2024 sinusitis, 10/2024 covid  Hospitalizations in the last year- none      last Pap-24- ascus neg hpv- due 2027  H/o abn pap-2024 ascus    FH ovarian, endometrial, cervical, uterine ca-none    Current birth control method-mirena  No change in contraception desired    last mammo- (40-75/90)-23  Self breast exams-yes    FH br ca-none    FH colon ca-none  Last colonoscopy 24; due 2029      Exercise- yoga once a wk; bought a walking pad; work FT and has a toddler  Diet-3 meals   Body mass index is 35.51 kg/m².      last eye  appt- contacts -summer 2024  No vision issues    last dental appt- thin gums-at some point grafting-gum surgery done-2nd surgery needs rescheduled;2024      BMs-regular  Sleep-able to fall asleep and stay asleep-uses trazodone prn; pos snoring but no apnea; daytime sleepiness noted  no cp, swelling, sob, abd pain, n/v/d/c, blood in stool or black stools  STI testing including hiv (age 15-65) and hep c screening (18-79)-declines      fractures in lifetime-rt wrist, toes, rt arm  Fh osteoporosis-none    FH heart attack, heart surgery-mom-afib  FH stroke-mgm, cousin      Patient Care Team     Relationship Specialty Notifications Start End   David Stokes MD Consulting Physician Endocrinology Admissions 25     Address:  [address] [place] [address]  [facility] [address] [facility] [address]        Review of Systems   Constitutional:  Negative for appetite change, chills, fatigue, fever and unexpected weight change.   HENT:  Negative for congestion, ear pain, sore throat and trouble swallowing.    Eyes:  Negative for pain, discharge and redness.   Respiratory:  Negative  "for cough and shortness of breath.    Cardiovascular:  Negative for chest pain and palpitations.   Gastrointestinal:  Negative for abdominal pain, blood in stool and vomiting.   Endocrine: Negative for polydipsia, polyphagia and polyuria.   Genitourinary:  Negative for dysuria, frequency, hematuria and urgency.   Musculoskeletal:  Negative for back pain and neck pain.   Skin:  Negative for rash and wound.   Allergic/Immunologic: Negative for immunocompromised state.   Neurological:  Negative for dizziness, syncope and headaches.   Hematological:  Negative for adenopathy. Does not bruise/bleed easily.   Psychiatric/Behavioral:  Negative for confusion and hallucinations.        Objective   Visit Vitals  /78   Pulse 91   Temp 36.1 °C (96.9 °F)        BP Readings from Last 3 Encounters:   04/08/25 117/78   02/03/25 116/74   10/26/24 117/84     Wt Readings from Last 3 Encounters:   04/08/25 99.8 kg (220 lb)   02/03/25 102 kg (225 lb)   10/13/24 103 kg (228 lb)     Lab Results   Component Value Date    CHOL 196 10/08/2024     Lab Results   Component Value Date    HDL 48.4 10/08/2024     Lab Results   Component Value Date    LDLCALC 130 (H) 10/08/2024     Lab Results   Component Value Date    TRIG 87 10/08/2024     No components found for: \"CHOLHDL\"  No results found for: \"HGBA1C\"   No results found for: \"ALBUR\", \"[id number]\"   The 10-year ASCVD risk score (Shanika HU, et al., 2019) is: 0.9%    Values used to calculate the score:      Age: 44 years      Sex: Female      Is Non- : No      Diabetic: No      Tobacco smoker: No      Systolic Blood Pressure: 117 mmHg      Is BP treated: No      HDL Cholesterol: 48.4 mg/dL      Total Cholesterol: 196 mg/dL     Physical Exam  Constitutional:       General: She is not in acute distress.     Appearance: Normal appearance. She is not ill-appearing.   HENT:      Head: Normocephalic.      Right Ear: Tympanic membrane, ear canal and external ear normal.      " Left Ear: Tympanic membrane, ear canal and external ear normal.      Nose: Nose normal.      Mouth/Throat:      Mouth: Mucous membranes are moist.      Pharynx: Oropharynx is clear.   Eyes:      Extraocular Movements: Extraocular movements intact.      Conjunctiva/sclera: Conjunctivae normal.      Pupils: Pupils are equal, round, and reactive to light.   Cardiovascular:      Rate and Rhythm: Normal rate and regular rhythm.      Heart sounds: Normal heart sounds. No murmur heard.  Pulmonary:      Effort: Pulmonary effort is normal. No respiratory distress.      Breath sounds: Normal breath sounds. No wheezing, rhonchi or rales.   Abdominal:      General: Bowel sounds are normal.      Palpations: Abdomen is soft. There is no mass.      Tenderness: There is no abdominal tenderness.   Musculoskeletal:         General: No swelling or tenderness. Normal range of motion.      Cervical back: Normal range of motion and neck supple.      Right lower leg: No edema.      Left lower leg: No edema.   Skin:     General: Skin is warm.      Findings: No rash.   Neurological:      General: No focal deficit present.      Mental Status: She is alert and oriented to person, place, and time.      Cranial Nerves: No cranial nerve deficit.      Motor: No weakness.   Psychiatric:         Mood and Affect: Mood normal.         Behavior: Behavior normal.         Assessment/Plan   1. Routine general medical examination at a health care facility (Primary)    - Comprehensive Metabolic Panel; Future  - Lipid Panel; Future  - TSH with reflex to Free T4 if abnormal; Future  - Comprehensive Metabolic Panel  - Lipid Panel  - TSH with reflex to Free T4 if abnormal    2. Vitamin D deficiency      3. Pure hypercholesterolemia      4. BMI 35.0-35.9,adult      5. Class 2 severe obesity due to excess calories with serious comorbidity and body mass index (BMI) of 35.0 to 35.9 in adult      6. High serum cortisol

## 2025-04-08 ENCOUNTER — APPOINTMENT (OUTPATIENT)
Dept: PRIMARY CARE | Facility: CLINIC | Age: 45
End: 2025-04-08
Payer: COMMERCIAL

## 2025-04-08 VITALS
DIASTOLIC BLOOD PRESSURE: 78 MMHG | HEIGHT: 66 IN | TEMPERATURE: 96.9 F | WEIGHT: 220 LBS | HEART RATE: 91 BPM | OXYGEN SATURATION: 95 % | SYSTOLIC BLOOD PRESSURE: 117 MMHG | BODY MASS INDEX: 35.36 KG/M2

## 2025-04-08 DIAGNOSIS — E55.9 VITAMIN D DEFICIENCY: ICD-10-CM

## 2025-04-08 DIAGNOSIS — E78.00 PURE HYPERCHOLESTEROLEMIA: ICD-10-CM

## 2025-04-08 DIAGNOSIS — Z00.00 ROUTINE GENERAL MEDICAL EXAMINATION AT A HEALTH CARE FACILITY: Primary | ICD-10-CM

## 2025-04-08 DIAGNOSIS — E66.01 CLASS 2 SEVERE OBESITY DUE TO EXCESS CALORIES WITH SERIOUS COMORBIDITY AND BODY MASS INDEX (BMI) OF 35.0 TO 35.9 IN ADULT: ICD-10-CM

## 2025-04-08 DIAGNOSIS — E66.812 CLASS 2 SEVERE OBESITY DUE TO EXCESS CALORIES WITH SERIOUS COMORBIDITY AND BODY MASS INDEX (BMI) OF 35.0 TO 35.9 IN ADULT: ICD-10-CM

## 2025-04-08 DIAGNOSIS — R79.89 HIGH SERUM CORTISOL: ICD-10-CM

## 2025-04-08 PROCEDURE — 3008F BODY MASS INDEX DOCD: CPT | Performed by: NURSE PRACTITIONER

## 2025-04-08 PROCEDURE — 99396 PREV VISIT EST AGE 40-64: CPT | Performed by: NURSE PRACTITIONER

## 2025-04-08 PROCEDURE — 1036F TOBACCO NON-USER: CPT | Performed by: NURSE PRACTITIONER

## 2025-04-08 ASSESSMENT — ANXIETY QUESTIONNAIRES
6. BECOMING EASILY ANNOYED OR IRRITABLE: SEVERAL DAYS
3. WORRYING TOO MUCH ABOUT DIFFERENT THINGS: SEVERAL DAYS
4. TROUBLE RELAXING: SEVERAL DAYS
IF YOU CHECKED OFF ANY PROBLEMS ON THIS QUESTIONNAIRE, HOW DIFFICULT HAVE THESE PROBLEMS MADE IT FOR YOU TO DO YOUR WORK, TAKE CARE OF THINGS AT HOME, OR GET ALONG WITH OTHER PEOPLE: SOMEWHAT DIFFICULT
5. BEING SO RESTLESS THAT IT IS HARD TO SIT STILL: NOT AT ALL
1. FEELING NERVOUS, ANXIOUS, OR ON EDGE: NOT AT ALL
7. FEELING AFRAID AS IF SOMETHING AWFUL MIGHT HAPPEN: NOT AT ALL
2. NOT BEING ABLE TO STOP OR CONTROL WORRYING: NOT AT ALL
GAD7 TOTAL SCORE: 3

## 2025-04-08 ASSESSMENT — PATIENT HEALTH QUESTIONNAIRE - PHQ9
1. LITTLE INTEREST OR PLEASURE IN DOING THINGS: SEVERAL DAYS
2. FEELING DOWN, DEPRESSED OR HOPELESS: SEVERAL DAYS
9. THOUGHTS THAT YOU WOULD BE BETTER OFF DEAD, OR OF HURTING YOURSELF: NOT AT ALL
3. TROUBLE FALLING OR STAYING ASLEEP OR SLEEPING TOO MUCH: SEVERAL DAYS
4. FEELING TIRED OR HAVING LITTLE ENERGY: SEVERAL DAYS
6. FEELING BAD ABOUT YOURSELF - OR THAT YOU ARE A FAILURE OR HAVE LET YOURSELF OR YOUR FAMILY DOWN: NOT AT ALL
8. MOVING OR SPEAKING SO SLOWLY THAT OTHER PEOPLE COULD HAVE NOTICED. OR THE OPPOSITE, BEING SO FIGETY OR RESTLESS THAT YOU HAVE BEEN MOVING AROUND A LOT MORE THAN USUAL: NOT AT ALL
7. TROUBLE CONCENTRATING ON THINGS, SUCH AS READING THE NEWSPAPER OR WATCHING TELEVISION: NOT AT ALL
SUM OF ALL RESPONSES TO PHQ QUESTIONS 1-9: 5
5. POOR APPETITE OR OVEREATING: SEVERAL DAYS
SUM OF ALL RESPONSES TO PHQ9 QUESTIONS 1 AND 2: 2

## 2025-04-08 NOTE — PATIENT INSTRUCTIONS
Obtain mammo order from dr cunningham    Take vitamin d otc consistently    Restart cpap with new mask    Keep endo appt for high cortisol    Handouts given to pt:  physical handout        Labs- No appt needed:    You can use the lab in our building when fasting. The hrs are: Mon-Fri 7a-330p.  No Saturday hrs. Bring the paper order.   OR   [facility] Mon-Fri 7a-12p. No Saturday hrs. Bring the paper order.  OR   [facility] Mon-Fri 630a-530p or Sat 6:30a-12p. Bring the paper order  OR  [facility] Mon-FrI 7a-5p  [facility] Mon-Fri 730a-4p, Sat  8a-12p  [facility] [address] Mon-Thurs 630a-6p , Fri 630a-4p, Sat 8a-12p  [facility] [address] Mon-Fri 7a-630p and Sat. 7a-3p    Fasting is no food, drink, gum or mints other than water for 12 hrs.   Results will be back in 2-3 business days for most labs. It is always recommended for any orders (labs, xrays, ultrasounds,MRI, ct scan, procedures etc) to check with your insurance provider for expected costs or expenses to you.         You will get your results via phone from my medical assistant if you do not have MyChart.  OR  You will get your results via [facility]    If a result is urgent, I will call to speak to you.    Vaccines:  Up to date      General recommendations:  Exercise-cardio 4-5d/wk 30min each day  Diet-Breakfast-toast (my favorite Leslie Lopezighful Multigrain or Isaias's Killer Bread Good Seed thin-sliced)/bagel/English muffin-whole wheat flour as a 1st ingredient or cereal/oatmeal/granola bar-fiber 4g or more or protein like eggs or peanut butter; optional veggies  Lunch-protein, 1/2c carb or 2 slices bread, veg 1c  Dinner-protein, fist sized carb, veg 1c  Fruit 2 a day  Dairy 2 a day-milk, soy milk, almond milk, cheese, yogurt, cottage cheese  Snacks-Protein-hard boiled egg, nuts (walnuts/almonds/pecans/pistachios 1/4c), hummus, beef/deer jerky or meat sticks; vegetable, fruit, dairy-milk(1%, skim,  almond, soy)/cheese (not a lot of cheddar)/yogurt (Greek is best-my favorite Dannon Fruit on the Bottom Greek)/cottage cheese 2%; triscuits/ popcorn/wheat thins have a lot of fiber; follow serving size on bag/box/container  increase water  Limit alcohol to 1 drink per day for women and 2 drinks per day for men (1 drink=12oz beer or 5oz wine or 1 1/2oz liquor)  Calcium: 500mg 1 twice a day if age 50 and younger and 600mg 1 twice a day if over age 50 (calcium citrate can be taken without food)  Vitamin D: 800-5000 IU/day  Limit salt to <2300mg a day if age 50 and under and <1500mg a day if over age 50/have high bp or diabetes or kidney disease  Recommend folate for childbearing age women 0.4mg per day (can be found in a multivitamin)  Recommend 18mg/dL of iron a day if age 50 and under and 8mg/dL a day if over age 50; take on an empty stomach at bedtime  Use sunscreen   Wear seatbelt  Recommend safe sex practices: using condoms everytime you have sex, discuss with a new partner about their past partners/history of STDs/drug use, avoid drinking alcohol or using drugs as this increases the chance that you will participate in high-risk sex, for oral sex help protect your mouth by having your partner use a condom (male or female), women should not douche after sex, be aware of your partner's body and your body-look for signs of a sore, blister, rash, or discharge, and have regular exams and periodic tests for STDs.  No distracted driving  No driving when under influence of substances  Wear a seatbelt  Eye dr every 1-2yrs  Dentist every 6-12 mon  Tetanus shot every 10yrs  Recommend flu vaccine in the fall  Appt in 6mon for follow up on diet exercise and 1 year for physical      I will communicate with you via drchrono regarding messages and results. If you need help with this, you can call the support line at [phone].    IT WAS A PLEASURE TO SEE YOU TODAY. THANK YOU FOR CHOOSING US FOR YOUR HEALTHCARE NEEDS.

## 2025-04-09 ENCOUNTER — TELEPHONE (OUTPATIENT)
Dept: PRIMARY CARE | Facility: CLINIC | Age: 45
End: 2025-04-09
Payer: COMMERCIAL

## 2025-04-09 LAB
ALBUMIN SERPL-MCNC: 4.3 G/DL (ref 3.6–5.1)
ALP SERPL-CCNC: 64 U/L (ref 31–125)
ALT SERPL-CCNC: 10 U/L (ref 6–29)
ANION GAP SERPL CALCULATED.4IONS-SCNC: 10 MMOL/L (CALC) (ref 7–17)
AST SERPL-CCNC: 16 U/L (ref 10–35)
BILIRUB SERPL-MCNC: 0.6 MG/DL (ref 0.2–1.2)
BUN SERPL-MCNC: 10 MG/DL (ref 7–25)
CALCIUM SERPL-MCNC: 9.2 MG/DL (ref 8.6–10.2)
CHLORIDE SERPL-SCNC: 105 MMOL/L (ref 98–110)
CHOLEST SERPL-MCNC: 225 MG/DL
CHOLEST/HDLC SERPL: 4 (CALC)
CO2 SERPL-SCNC: 24 MMOL/L (ref 20–32)
CREAT SERPL-MCNC: 0.74 MG/DL (ref 0.5–0.99)
EGFRCR SERPLBLD CKD-EPI 2021: 101 ML/MIN/1.73M2
GLUCOSE SERPL-MCNC: 95 MG/DL (ref 65–99)
HDLC SERPL-MCNC: 56 MG/DL
LDLC SERPL CALC-MCNC: 145 MG/DL (CALC)
NONHDLC SERPL-MCNC: 169 MG/DL (CALC)
POTASSIUM SERPL-SCNC: 4.2 MMOL/L (ref 3.5–5.3)
PROT SERPL-MCNC: 6.7 G/DL (ref 6.1–8.1)
SODIUM SERPL-SCNC: 139 MMOL/L (ref 135–146)
TRIGL SERPL-MCNC: 118 MG/DL
TSH SERPL-ACNC: 1.42 MIU/L

## 2025-04-09 NOTE — TELEPHONE ENCOUNTER
----- Message from Heike Gonzalez sent at 4/9/2025 10:07 AM EDT -----  Hdl and trigs normal.  Ldl high at  145. Goal <100. Decr fats and incr fiber.   Sugar (aka glucose), kidney function, liver function and electrolytes in the CMP (comprehensive metabolic panel) were normal.  TSH (thyroid test) was normal.  I can't put this in mychart for some reason

## 2025-05-05 ENCOUNTER — OFFICE VISIT (OUTPATIENT)
Dept: URGENT CARE | Age: 45
End: 2025-05-05
Payer: COMMERCIAL

## 2025-05-05 VITALS
RESPIRATION RATE: 16 BRPM | HEIGHT: 66 IN | HEART RATE: 95 BPM | WEIGHT: 220 LBS | SYSTOLIC BLOOD PRESSURE: 125 MMHG | BODY MASS INDEX: 35.36 KG/M2 | DIASTOLIC BLOOD PRESSURE: 81 MMHG | TEMPERATURE: 97.6 F | OXYGEN SATURATION: 98 %

## 2025-05-05 DIAGNOSIS — J06.9 VIRAL URI: Primary | ICD-10-CM

## 2025-05-05 PROCEDURE — 3008F BODY MASS INDEX DOCD: CPT | Performed by: FAMILY MEDICINE

## 2025-05-05 PROCEDURE — 99213 OFFICE O/P EST LOW 20 MIN: CPT | Performed by: FAMILY MEDICINE

## 2025-05-05 PROCEDURE — 1036F TOBACCO NON-USER: CPT | Performed by: FAMILY MEDICINE

## 2025-05-05 NOTE — PROGRESS NOTES
"Subjective   Patient ID: Sakshi Kahn is a 44 y.o. female. They present today with a chief complaint of Sinusitis.    History of Present Illness  HPI  2 days of cough, congestion, postnasal drip runny nose.  Sinus pressure and pain.  No fevers have been noted. Patient denies shortness of breath, chest pain, or wheezing. No red eyes, eye pain, or discharge. They deny nausea vomiting or diarrhea. No known exposures to strep mono influenza, COVID-19 or pneumonia. No skin rashes are noted. Over-the-counter medications are offering minimal relief from symptoms.      Past Medical History  Allergies as of 05/05/2025 - Reviewed 05/05/2025   Allergen Reaction Noted    Aspirin Other 10/05/2023    Contrave [naltrexone-bupropion] Nausea/vomiting 03/08/2024    Ibuprofen Bleeding 10/05/2023    Nsaids (non-steroidal anti-inflammatory drug) Bleeding 10/26/2024       Prescriptions Prior to Admission[1]     Medical History[2]    Surgical History[3]     reports that she has never smoked. She has never been exposed to tobacco smoke. She has never used smokeless tobacco. She reports current alcohol use of about 2.0 - 3.0 standard drinks of alcohol per week. She reports that she does not use drugs.    Review of Systems  Review of Systems     As in history of present illness                          Objective    Vitals:    05/05/25 0829   BP: 125/81   BP Location: Right arm   Patient Position: Sitting   BP Cuff Size: Adult   Pulse: 95   Resp: 16   Temp: 36.4 °C (97.6 °F)   TempSrc: Oral   SpO2: 98%   Weight: 99.8 kg (220 lb)   Height: 1.676 m (5' 6\")     Patient's last menstrual period was 04/21/2025 (approximate).    Physical Exam  Gen.-alert cooperative and in no apparent distress  Head and face- no swelling redness, tenderness or rash  Eyes-EOMI, no redness or discharge is noted  ENT- bilateral nasal congestion with clear rhinorrhea and postnasal drip in oral pharynx  Neck- normal range of motion with no lymphadenopathy or mass. "   Pulmonary- no respiratory distress noted. Lungs clear to auscultation without wheezes rhonchi or rales  Skin- no rashes discoloration or skin lesions noted  Lymphatic-- no lymph node swelling or tenderness appreciated  Procedures    Point of Care Test & Imaging Results from this visit  No results found for this visit on 05/05/25.   Imaging  No results found.    Cardiology, Vascular, and Other Imaging  No other imaging results found for the past 2 days      Diagnostic study results (if any) were reviewed by Davion Colindres MD.    Assessment/Plan   Allergies, medications, history, and pertinent labs/EKGs/Imaging reviewed by Davion Colindres MD.     Medical Decision Making  At time of discharge patient was clinically well-appearing and HDS for outpatient management. The patient and/or family was educated regarding diagnosis, supportive care, OTC and Rx medications. The patient and/or family was given the opportunity to ask questions prior to discharge.  They verbalized understanding of my discussion of the plans for treatment, expected course, indications to return to  or seek further evaluation in ED, and the need for timely follow up as directed.   They were provided with a work/school excuse if requested.    Orders and Diagnoses  Diagnoses and all orders for this visit:  Viral URI      Medical Admin Record      Patient disposition: Home    Electronically signed by Davion Colindres MD  8:46 AM           [1] (Not in a hospital admission)   [2]   Past Medical History:  Diagnosis Date    Clotting disorder (Multi) antiphospholipid antibody syndrome; diagnosed in 2020    Other specified health status     No pertinent past medical history    Personal history of other infectious and parasitic diseases 02/18/2020    History of gonorrhea    Personal history of other venous thrombosis and embolism 02/18/2020    History of deep venous thrombosis   [3]   Past Surgical History:  Procedure Laterality Date    ADENOIDECTOMY  1983     BACK SURGERY  2013    OTHER SURGICAL HISTORY  02/18/2020    Tonsillectomy with adenoidectomy    OTHER SURGICAL HISTORY  02/18/2020    Lumbar laminectomy

## 2025-05-05 NOTE — PATIENT INSTRUCTIONS
HOME CARE INSTRUCTIONS:   ---Over-the-counter cough and cold medications as needed.  Mucinex D or Claritin-D  --- Flonase nasal spray daily for the next 2 weeks  --- gargle with lightly salted water several times a day  --- May take over-the-counter Tylenol for pain and fever control  --- Plenty of rest and sleep  --- Drink lots of fluids  --- Cover  your mouth and nose when coughing  or sneezing  --- Wash your hands often    SEEK FURTHER MEDICAL ATTENTION OR GO THE EMERGENCY ROOM IF:  --- Fever persists more than 3 days, or elevated over 104°  --- You have difficulty breathing or cannot catch your breath  --- Vomiting: unable to keep liquids, food or medications on stomach  --- Symptoms do not improve after 5-7  --- You Become listless, or get a stiff neck    Advised the patient to seek immediate Emergency Medical attention if symptoms fail to improve, worsen or any concerning symptoms arise.

## 2025-05-06 ENCOUNTER — APPOINTMENT (OUTPATIENT)
Dept: HEMATOLOGY/ONCOLOGY | Facility: CLINIC | Age: 45
End: 2025-05-06
Payer: COMMERCIAL

## 2025-05-11 ENCOUNTER — TELEMEDICINE (OUTPATIENT)
Dept: URGENT CARE | Age: 45
End: 2025-05-11
Payer: COMMERCIAL

## 2025-05-11 DIAGNOSIS — J06.9 UPPER RESPIRATORY TRACT INFECTION, UNSPECIFIED TYPE: Primary | ICD-10-CM

## 2025-05-11 PROCEDURE — 1036F TOBACCO NON-USER: CPT | Performed by: STUDENT IN AN ORGANIZED HEALTH CARE EDUCATION/TRAINING PROGRAM

## 2025-05-11 PROCEDURE — 99213 OFFICE O/P EST LOW 20 MIN: CPT | Performed by: STUDENT IN AN ORGANIZED HEALTH CARE EDUCATION/TRAINING PROGRAM

## 2025-05-11 RX ORDER — BENZONATATE 200 MG/1
200 CAPSULE ORAL EVERY 8 HOURS
Qty: 30 CAPSULE | Refills: 0 | Status: SHIPPED | OUTPATIENT
Start: 2025-05-11 | End: 2025-05-21

## 2025-05-11 RX ORDER — BENZONATATE 200 MG/1
200 CAPSULE ORAL EVERY 8 HOURS
Qty: 30 CAPSULE | Refills: 0 | Status: SHIPPED | OUTPATIENT
Start: 2025-05-11 | End: 2025-05-11 | Stop reason: SDUPTHER

## 2025-05-11 RX ORDER — METHYLPREDNISOLONE 4 MG/1
TABLET ORAL
Qty: 21 TABLET | Refills: 0 | Status: SHIPPED | OUTPATIENT
Start: 2025-05-11 | End: 2025-05-17

## 2025-05-11 RX ORDER — BENZONATATE 200 MG/1
200 CAPSULE ORAL EVERY 8 HOURS
Qty: 30 CAPSULE | Refills: 0 | Status: SHIPPED | OUTPATIENT
Start: 2025-05-11 | End: 2025-05-11

## 2025-05-11 RX ORDER — DOXYCYCLINE HYCLATE 100 MG
100 TABLET ORAL 2 TIMES DAILY
Qty: 20 TABLET | Refills: 0 | Status: SHIPPED | OUTPATIENT
Start: 2025-05-11 | End: 2025-05-11 | Stop reason: SDUPTHER

## 2025-05-11 RX ORDER — DOXYCYCLINE HYCLATE 100 MG
100 TABLET ORAL 2 TIMES DAILY
Qty: 20 TABLET | Refills: 0 | Status: SHIPPED | OUTPATIENT
Start: 2025-05-11 | End: 2025-05-21

## 2025-05-11 RX ORDER — DOXYCYCLINE HYCLATE 100 MG
100 TABLET ORAL 2 TIMES DAILY
Qty: 20 TABLET | Refills: 0 | Status: SHIPPED | OUTPATIENT
Start: 2025-05-11 | End: 2025-05-11

## 2025-05-11 RX ORDER — METHYLPREDNISOLONE 4 MG/1
TABLET ORAL
Qty: 21 TABLET | Refills: 0 | Status: SHIPPED | OUTPATIENT
Start: 2025-05-11 | End: 2025-05-11 | Stop reason: SDUPTHER

## 2025-05-11 RX ORDER — METHYLPREDNISOLONE 4 MG/1
TABLET ORAL
Qty: 21 TABLET | Refills: 0 | Status: SHIPPED | OUTPATIENT
Start: 2025-05-11 | End: 2025-05-11

## 2025-05-11 NOTE — PROGRESS NOTES
Urgent Care Virtual Video Visit    Patient Location: home  Provider Location: [facility]    I have communicated my name and active licensure. Video visit completed with realtime synchronous video/audio connection. Informed consent was obtained from the patient. Patient was made aware that my evaluation and diagnosis are limited due to the fact that we are not in the same room during the interview and that this is a virtual encounter that took place via videoconferencing. Patient verbalized understanding.     Patient presents to urgent care via virtual visit for a chief complaint of concern of sinusitis as patient was seen Monday at our [place] location and diagnosed with viral URI patient states that she is now having maxillary sinus pressure, yellowish-green nasal discharge postnasal drip and lingering cough, no report of vomiting diarrhea shortness of breath or chest pain, has taken Claritin-D which did seem to help patient reports that she does have a history of sinusitis    Due to patient duration of symptoms and symptomology along with patient history I am agreeable to place patient on doxycycline, Medrol Dosepak and Tessalon Perles as it would cover for possible sinusitis, patient was advised if no resolution regression of symptoms in 7 to 10 days recommend in person visit either at the urgent care or primary care patient verbalized understanding agreeable to plan discharge emergent care A+O x 4 stable condition no signs of distress    Patient disposition:     Electronically signed by Veto Story PA-C  9:56 AM     The patient is a 7m Male complaining of

## 2025-06-09 ENCOUNTER — PATIENT MESSAGE (OUTPATIENT)
Dept: HEMATOLOGY/ONCOLOGY | Facility: CLINIC | Age: 45
End: 2025-06-09
Payer: COMMERCIAL

## 2025-06-10 DIAGNOSIS — R76.0 ANTIPHOSPHOLIPID ANTIBODY POSITIVE: ICD-10-CM

## 2025-06-10 NOTE — PROGRESS NOTES
I spoke with Sakshi, she is aware that per Dr. Pedroza, the current symptoms she is endorsing are not common with Johny. Sakshi is also aware the Dr. Pedroza is recommending that Sakshi see dermatology for her current symptoms, to see if she is experiencing a true rash and that the team can place a referral to derm if needed. Sakshi gave verbal understanding of this information, stating she would like a dermatology referral, since she hasn't seen a dermatologist in few years. She is aware that the order will be placed and a scheduling team member will be giving her a call to schedule. She is also aware that if a scheduling team member does not call her to schedule her referral by the end of the week, then she is to call the team for assistance. She gave verbal understanding of this information, was appreciative of the call and had no other questions at this time.

## 2025-06-20 ENCOUNTER — APPOINTMENT (OUTPATIENT)
Dept: ENDOCRINOLOGY | Facility: CLINIC | Age: 45
End: 2025-06-20
Payer: COMMERCIAL

## 2025-06-23 ENCOUNTER — HOSPITAL ENCOUNTER (OUTPATIENT)
Dept: RADIOLOGY | Facility: EXTERNAL LOCATION | Age: 45
Discharge: HOME | End: 2025-06-23
Payer: COMMERCIAL

## 2025-06-25 ENCOUNTER — HOSPITAL ENCOUNTER (OUTPATIENT)
Dept: RADIOLOGY | Facility: HOSPITAL | Age: 45
Discharge: HOME | End: 2025-06-25
Payer: COMMERCIAL

## 2025-06-25 DIAGNOSIS — R92.2 INCONCLUSIVE MAMMOGRAM: ICD-10-CM

## 2025-06-25 PROCEDURE — A9575 INJ GADOTERATE MEGLUMI 0.1ML: HCPCS

## 2025-06-25 PROCEDURE — 2550000001 HC RX 255 CONTRASTS

## 2025-06-25 PROCEDURE — 6100000003 BI MR BREAST BILATERAL WITH CONTRAST FAST SCREENING SELF PAY

## 2025-06-25 RX ORDER — GADOTERATE MEGLUMINE 376.9 MG/ML
19 INJECTION INTRAVENOUS
Status: COMPLETED | OUTPATIENT
Start: 2025-06-25 | End: 2025-06-25

## 2025-06-25 RX ADMIN — GADOTERATE MEGLUMINE 19 ML: 376.9 INJECTION INTRAVENOUS at 08:26

## 2025-06-26 ENCOUNTER — APPOINTMENT (OUTPATIENT)
Dept: ENDOCRINOLOGY | Facility: CLINIC | Age: 45
End: 2025-06-26
Payer: COMMERCIAL

## 2025-06-26 VITALS
DIASTOLIC BLOOD PRESSURE: 72 MMHG | HEIGHT: 66 IN | WEIGHT: 219 LBS | SYSTOLIC BLOOD PRESSURE: 118 MMHG | BODY MASS INDEX: 35.2 KG/M2

## 2025-06-26 DIAGNOSIS — G47.33 OSA (OBSTRUCTIVE SLEEP APNEA): ICD-10-CM

## 2025-06-26 DIAGNOSIS — E78.00 PURE HYPERCHOLESTEROLEMIA: ICD-10-CM

## 2025-06-26 DIAGNOSIS — E55.9 VITAMIN D DEFICIENCY: ICD-10-CM

## 2025-06-26 DIAGNOSIS — R79.89 ELEVATED CORTISOL LEVEL: Primary | ICD-10-CM

## 2025-06-26 PROCEDURE — 3008F BODY MASS INDEX DOCD: CPT | Performed by: INTERNAL MEDICINE

## 2025-06-26 PROCEDURE — 99244 OFF/OP CNSLTJ NEW/EST MOD 40: CPT | Performed by: INTERNAL MEDICINE

## 2025-06-26 RX ORDER — DEXAMETHASONE 1 MG/1
TABLET ORAL
Qty: 1 TABLET | Refills: 0 | Status: SHIPPED | OUTPATIENT
Start: 2025-06-26 | End: 2025-06-26 | Stop reason: SDUPTHER

## 2025-06-26 RX ORDER — DEXAMETHASONE 1 MG/1
TABLET ORAL
Qty: 1 TABLET | Refills: 0 | Status: SHIPPED | OUTPATIENT
Start: 2025-06-26 | End: 2025-07-06

## 2025-06-26 NOTE — PROGRESS NOTES
Patient ID: Sakshi Kahn is a 44 y.o. female who presents for New Patient Visit (Referred by Heike Gonzalez CNP for elevated cortisol levels ).  HPI  The patient is referred for evaluation of elevated cortisol.    This is a 44-year-old female who went to a [facility] medicine facility in January and had salivary cortisol levels that were elevated a.m. noon evening and night.    They were not drawn for any specific reason.    She has never had a previous workup.    She has some easy bruising but is on a blood thinner for antiphospholipid antibody.    She has no symptoms of adrenal excess.    Her periods are regular for the most part.    She had a normal MRI of her adrenals in May 2023.    She has a past history of sleep apnea hyperlipidemia vitamin D deficiency DVT back surgery.    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    Family history positive diabetes in a paternal aunt.        ROS  Comprehensive review of systems is negative.    Objective   Physical Exam  Visit Vitals  /72      Vitals:    06/26/25 0850   Weight: 99.3 kg (219 lb)      Body mass index is 35.35 kg/m².      Alert and oriented x3  In no distress  No focal neurologic deficits  No supraclavicular, or dorsal fat  No purple striae  Integument intact  Eyes normal  ENT normal. No adenopathy  Thyroid palpable and normal. No nodules  Chest clear to auscultation  Heart sounds are normal  Abdomen nontender. Bowel sounds normal. No organomegaly  Feet are okay  Reflexes normal with normal return    Current Medications[1]    Assessment/Plan     1.  Abnormal salivary cortisol levels  2.  Sleep apnea  3.  Hyperlipidemia  4.  Vitamin D deficiency    We discussed the results from the salivary levels.    We discussed the possibility that these results are inaccurate.    We discussed symptoms of adrenal excess for which she really does not have any.    Will do a low-dose dexamethasone suppression test.    We  discussed the possible outcomes of the results.    Will consider a 24-hour urine for cortisol if results are equivocal.    If negative we discussed that there is no need for further follow-up.             [1]   Current Outpatient Medications   Medication Sig Dispense Refill    cholecalciferol (Vitamin D-3) 50 mcg (2,000 unit) capsule Take 1 capsule (50 mcg) by mouth once daily.      dexAMETHasone (Decadron) 1 mg tablet Take 1 tablet when directed 1 tablet 0    levonorgestrel (Mirena) 21 mcg/24 hours (8 yrs) 52 mg IUD 52 mg by intrauterine route 1 time.      magnesium oxide (Mag-Ox) 400 mg tablet Take 1 tablet (400 mg) by mouth once daily.      multivitamin tablet Take 1 tablet by mouth once daily.      rivaroxaban (Xarelto) 20 mg tablet Take 1 tablet (20 mg) by mouth once daily. Take with food. 90 tablet 2    traZODone (Desyrel) 50 mg tablet TAKE 0.5-2 TABLETS BY MOUTH AS NEEDED AT BEDTIME FOR SLEEP. 180 tablet 2     No current facility-administered medications for this visit.

## 2025-07-03 LAB
CORTIS SERPL-MCNC: 1.1 MCG/DL
DEXAMETHASONE SERPL-MCNC: NORMAL NG/DL

## 2025-07-10 LAB
CORTIS SERPL-MCNC: 1.1 MCG/DL
DEXAMETHASONE SERPL-MCNC: 197 NG/DL

## 2025-07-14 ENCOUNTER — APPOINTMENT (OUTPATIENT)
Dept: ALLERGY | Facility: CLINIC | Age: 45
End: 2025-07-14
Payer: COMMERCIAL

## 2025-07-14 VITALS
HEART RATE: 82 BPM | BODY MASS INDEX: 34.55 KG/M2 | HEIGHT: 66 IN | RESPIRATION RATE: 18 BRPM | SYSTOLIC BLOOD PRESSURE: 126 MMHG | DIASTOLIC BLOOD PRESSURE: 76 MMHG | TEMPERATURE: 98.1 F | OXYGEN SATURATION: 98 % | WEIGHT: 215 LBS

## 2025-07-14 DIAGNOSIS — D80.6 ANTI-POLYSACCHARIDE ANTIBODY DEFICIENCY (MULTI): ICD-10-CM

## 2025-07-14 DIAGNOSIS — F43.21 GRIEVING: ICD-10-CM

## 2025-07-14 DIAGNOSIS — R23.2 FLUSHING: Primary | ICD-10-CM

## 2025-07-14 DIAGNOSIS — D80.6 ANTIBODY DEFICIENCY SYNDROME (MULTI): ICD-10-CM

## 2025-07-14 PROCEDURE — 3008F BODY MASS INDEX DOCD: CPT | Performed by: ALLERGY & IMMUNOLOGY

## 2025-07-14 PROCEDURE — 90732 PPSV23 VACC 2 YRS+ SUBQ/IM: CPT | Performed by: ALLERGY & IMMUNOLOGY

## 2025-07-14 PROCEDURE — 99214 OFFICE O/P EST MOD 30 MIN: CPT | Performed by: ALLERGY & IMMUNOLOGY

## 2025-07-14 PROCEDURE — 90471 IMMUNIZATION ADMIN: CPT | Performed by: ALLERGY & IMMUNOLOGY

## 2025-07-14 NOTE — PROGRESS NOTES
Patient ID: Sakshi Kahn is a 44 y.o. female.     Chief Complaint: NPV referred by Heike Gonzalez NP  History Of Present Illness  Sakshi Kahn is a 44 y.o. female with PMx history of allergic rhinitis presenting for allergic rhinitis.     Food Allergy  No    Eczema/ Atopic Dermatitis  No  She has mild scalp psoriasis. For this she uses coal tar shampoo.  Has not seen derm for this.    Asthma  No    Rhinoconjunctivitis  Spring a fall problematic symptoms of rhinorrhea and congestion  She takes prn allergy medications. Recently has tried pseudofed and mucinex for throat congestion and sinus headache.  She feels the achining under the eyes.  Currently not on antihistamines or nasal sprays. Has never regularly used her nasal spray.    Drug Allergy   NSAID-does not take due to anti phospholipid antibody syndrome.    Insect Allergy   No    Infections  Anitbotics about two times a year for sinusitis in the spring and fall.  Takes Xarelto for antiphospholipid antibody syndrome and sees Dr. Pedroza      Review of Systems    Pertinent positives and negatives have been assessed in the HPI. All other systems have been reviewed and are negative except as noted in the HPI.    Allergies  Aspirin, Contrave [naltrexone-bupropion], Ibuprofen, and Nsaids (non-steroidal anti-inflammatory drug)    Past Medical History  She has a past medical history of Clotting disorder (Multi) (antiphospholipid antibody syndrome; diagnosed in 2020), Other specified health status, Personal history of other infectious and parasitic diseases (02/18/2020), and Personal history of other venous thrombosis and embolism (02/18/2020).    Family History  Family History   Problem Relation Name Age of Onset    Atrial fibrillation Mother Kimberly Desouza     No Known Problems Father      Other (Cerebrovascular accident) Maternal Grandmother Leta Araiza     Stroke Maternal Grandmother Leta Araiza     Other (cerebrovascular accident) Other cousin      Long QT syndrome Other cousin     Psoriasis Other      Hearing loss Paternal Grandfather Cedrick Araiza        Parents both have allergic rhinitis.    Surgical History  She has a past surgical history that includes Other surgical history (02/18/2020); Other surgical history (02/18/2020); Adenoidectomy (1983); Back surgery (2013); and Tonsillectomy (1983).   Tonsils and adenoids removed as a child.  Social/Environmental History  She reports that she has never smoked. She has never been exposed to tobacco smoke. She has never used smokeless tobacco. She reports current alcohol use. She reports that she does not use drugs.    Home: Lives in a house   Floors: Wood and carpeting mixed  Air Conditioning: Central  Smoker: No  Pets: No  Infestations: No  Molds: No  Occupation: social work, staffing for non profit    MEDICATIONS  Current Outpatient Medications on File Prior to Visit   Medication Sig Dispense Refill    levonorgestrel (Mirena) 21 mcg/24 hours (8 yrs) 52 mg IUD 52 mg by intrauterine route 1 time.      rivaroxaban (Xarelto) 20 mg tablet Take 1 tablet (20 mg) by mouth once daily. Take with food. 90 tablet 2    cholecalciferol (Vitamin D-3) 50 mcg (2,000 unit) capsule Take 1 capsule (50 mcg) by mouth once daily. (Patient not taking: Reported on 7/14/2025)      dexAMETHasone (Decadron) 1 mg tablet Take 1 tablet when directed 1 tablet 0    magnesium oxide (Mag-Ox) 400 mg tablet Take 1 tablet (400 mg) by mouth once daily. (Patient not taking: Reported on 7/14/2025)      multivitamin tablet Take 1 tablet by mouth once daily. (Patient not taking: Reported on 7/14/2025)      traZODone (Desyrel) 50 mg tablet TAKE 0.5-2 TABLETS BY MOUTH AS NEEDED AT BEDTIME FOR SLEEP. (Patient not taking: Reported on 7/14/2025) 180 tablet 2     No current facility-administered medications on file prior to visit.         Physical Exam  Visit Vitals  /76 (BP Location: Left arm, Patient Position: Sitting, BP Cuff Size: Adult)  "  Pulse 82   Temp 36.7 °C (98.1 °F)   Resp 18   Ht 1.676 m (5' 6\")   Wt 97.5 kg (215 lb)   SpO2 98%   BMI 34.70 kg/m²   OB Status Having periods   Smoking Status Never   BSA 2.13 m²       Wt Readings from Last 1 Encounters:   07/14/25 97.5 kg (215 lb)       Physical Exam    General: Well appearing, no acute distress  Head: Normocephalic, atraumatic, neck supple without lymphadenopathy  Eyes: EOMI, non-injected, dark puffy circles under the eyes  Ears: TM's normal  Nose: No nasal crease, nares patent, minimal discharge  Throat: Normal dentition, no erythema, tonsils removed  Heart: Regular rate and rhythm  Lungs: Clear to auscultation bilaterally, effort normal  Abdomen: Soft, non-tender, normal bowel sounds  Extremities: Moves all extremities symmetrically, no edema  Skin: No rashes/lesions  Psych: normal mood and affect    LAB RESULTS:  CBC:  Recent Labs     02/03/25  1110 01/18/23  0858 05/04/22  1515   WBC 8.5 5.5 6.9   HGB 15.0 14.1 13.5   HCT 46.2* 42.2 41.4    198 235   MCV 99.4 97 89   EOSABS 247 0.26 0.09       CMP:  Recent Labs     04/08/25  0843 12/02/19  1358    139   K 4.2 3.9    107   CO2 24 25   ANIONGAP 10 11   BUN 10 17   CREATININE 0.74 0.80   EGFR 101  --      Recent Labs     04/08/25  0843 12/02/19  1358   ALBUMIN 4.3 4.2   ALKPHOS 64 49   ALT 10 16   AST 16 20   BILITOT 0.6 0.4     Recent Labs     02/03/25  1110 01/18/23  0858 05/04/22  1515   EOSABS 247 0.26 0.09         ABO:   Recent Labs     06/13/19  1630   ABO A       HEME/ENDO:  Recent Labs     04/08/25  0843 05/04/22  1515 02/28/20  1210   TSH 1.42  --  1.63   FERRITIN  --  12  --    IRONSAT  --  11*  --      Allergy skin tests reviewed and scanned.    Assessment/Plan     Sakshi is a 43 yo woman with a history of chronic rhinitis without allergy, antiphospholipid antibody syndrome and scalp psoriasis.  She has low S. Pneumo antibody titers. She continues to complain of flushing and warmth sensation. She is grieving the " loss of her mother.  -pneumovax with repeat titers in a month  -additional labs for flushing.  -reviewed lack of evidence for histamine intolerance.  Recommend healthy diet/exercise, rest and monitor use of supplements.  -support provided for stressful life events with loss of her mother and plans to move to a new home.  Will plan follow up in 6 months, but will call results to patient in the meantime to discuss additional plans if needed.      Melissa Pace, DO

## 2025-07-14 NOTE — PATIENT INSTRUCTIONS
Obtain labs for flushing.  In a month recheck titers.  I will call results  Schedule a 6 month follow up.

## 2025-07-17 LAB
ANA SER QL IF: NEGATIVE
CRP SERPL-MCNC: 5.1 MG/L
ERYTHROCYTE [SEDIMENTATION RATE] IN BLOOD BY WESTERGREN METHOD: 14 MM/H
HAEM INFLU B IGG SER IA-MCNC: >9 MCG/ML
THYROPEROXIDASE AB SERPL-ACNC: 1 IU/ML
TRYPTASE SERPL-MCNC: 4 MCG/L
TSH SERPL-ACNC: 0.83 MIU/L

## 2025-07-20 ENCOUNTER — PATIENT MESSAGE (OUTPATIENT)
Dept: PRIMARY CARE | Facility: CLINIC | Age: 45
End: 2025-07-20
Payer: COMMERCIAL

## 2025-07-20 DIAGNOSIS — R76.0 ANTIPHOSPHOLIPID ANTIBODY POSITIVE: ICD-10-CM

## 2025-07-20 DIAGNOSIS — Z82.49 FAMILY HISTORY OF ATRIAL FIBRILLATION: ICD-10-CM

## 2025-07-20 DIAGNOSIS — Z84.89 FAMILY HISTORY OF SUDDEN DEATH IN MOTHER: ICD-10-CM

## 2025-07-20 DIAGNOSIS — E78.00 PURE HYPERCHOLESTEROLEMIA: Primary | ICD-10-CM

## 2025-07-28 ENCOUNTER — APPOINTMENT (OUTPATIENT)
Dept: HEMATOLOGY/ONCOLOGY | Facility: CLINIC | Age: 45
End: 2025-07-28
Payer: COMMERCIAL

## 2025-07-30 ENCOUNTER — PATIENT MESSAGE (OUTPATIENT)
Dept: PRIMARY CARE | Facility: CLINIC | Age: 45
End: 2025-07-30
Payer: COMMERCIAL

## 2025-07-30 DIAGNOSIS — R10.10 UPPER ABDOMINAL PAIN: Primary | ICD-10-CM

## 2025-07-31 ENCOUNTER — HOSPITAL ENCOUNTER (OUTPATIENT)
Dept: RADIOLOGY | Facility: CLINIC | Age: 45
Discharge: HOME | End: 2025-07-31
Payer: COMMERCIAL

## 2025-07-31 DIAGNOSIS — R10.10 UPPER ABDOMINAL PAIN: ICD-10-CM

## 2025-07-31 PROCEDURE — 76705 ECHO EXAM OF ABDOMEN: CPT

## 2025-07-31 NOTE — PROGRESS NOTES
Patient ID: Sakshi Kahn is a 44 y.o. female.    Subjective    HPI  Ms. Sakshi Kahn is a 44 y.o. female with history of a provoked DVT who presents for history of antiphospholipid syndrome.     She has been doing well and has recovered from her dental surgery. She is planning to move within the next 3 weeks.     Patient's past medical history, surgical history, family history and social history reviewed.    Review of Systems:   Review of Systems:    Positive per HPI, otherwise negative.     Objective   Visit Vitals  /75 (BP Location: Right arm, Patient Position: Sitting, BP Cuff Size: Large adult)   Pulse 73   Temp 36.7 °C (98.1 °F) (Temporal)   Resp 16   Wt 97.3 kg (214 lb 8.1 oz)   SpO2 96%   BMI 34.62 kg/m²   OB Status Having periods   Smoking Status Never   BSA 2.13 m²     Physical Exam  Gen: appears well in clinic, NAD  HEENT: atraumatic head, normocephalic, EOMI, conjunctiva normal  LUNG: no increased WOB, CTAB  CV: No JVD. RRR  GI: soft, NT, ND  LE: no LE edema  Skin: no obvious rashes or lesions on visible skin  Neuro: interactive, no focal deficits noted  Psych: normal mood and affect    Performance Status:  Symptomatic; fully ambulatory    Labs/Imaging/Pathology: personally reviewed reports and images in Epic electronic medical record system. Pertinent results as it related to the plan represented in below in assessment and plan.      Assessment/Plan   1. Antiphospholipid syndrome, postive anticardiolipin and [id number] IgM x2  2. History of provoked RLE DVT  3. Planned for in-vitro fertilization      - Underwent screening for thrombophilia during initial visit 12/2/19  - positive for anticardiolipin IgM and beta2 glycoprotein IgM antibodies x2. She did also have a positive lupus anticoagulant however it was gone 12 weeks later  - Discussed  with patient the [place] criteria for antiphospholipid syndrome which includes meeting at least one of the clinical criteria (a vascular  thrombosis or pregnancy morbidity) along with at least one of the laboratory criteria which includes positive anticardiolipin  or beta-2 glycoprotein antibodies or a positive lupus anticoagulant  -  Given IgM was persistently elevated x2 she does meet criteria  - discussed that DOACs are thought to be inferior to warfarin however this data is strongest for triple positive disease-  - due to pregnancy placed on lovenox  - 2/2022 6 weeks post partum resumed on xarelto due to pt preference    10/29/24  - tolerated xarelto fine  - planning for gum surgery and requested to stay on xarelto but now open going to warfarin after surgery   - recommend in preparation for surgery, hold xarelto 2 days prior and resume day after surgery  - for any upcoming procedures when on warfarin in preparation for her surgery, recommend lovenox bridging, where she should hold warfarin 5 days prior and start lovenox 100mg subcutaneous q12 h until two days prior to surgery. Then should resume warfarin day after surgery and continue lovenox until INR is therapeutic 2-3  - however wishes to stay on xarelto for now until procedures are completed  - will continue xarelto for now  - RTC in March and will discuss warfarin.  This note has been transcribed using a medical scribe and there is a possibility of unintentional typing misprints.     2/7/25  -Patient is planned for her final surgery next month and we discussed that she can hold on restarting her Xarelto for 2 to 3 days post operation to allow for the use of NSAIDs    -During her last surgery she started her Xarelto the day after and could not take NSAIDs which led to increased swelling and and prolonged healing  -Return to clinic in 3 months for consideration of starting warfarin    8/1/25:   - Given antiphospholipid syndrome, she is open to Warfarin as she is now done with procedures   - We discussed that we will plan to start Warfarin 5 mg today   - She should continue Xarelto until INR  between 2-3   - Baseline CBC today   - Referral placed to Coumadin clinic   - She is interested in self monitoring if possible   - RTC in 6 months, virtual visit      RTC in 6 months, virtual visit  This note has been transcribed using a medical scribe and there is a possibility of unintentional typing misprints    Diagnoses and all orders for this visit:  Antiphospholipid antibody positive  -     Clinic Appointment Request  -     [facility] Initial Referral to Anticoagulation (Warfarin) Monitoring Service  -     CBC and Auto Differential; Future  -     warfarin (Coumadin) 5 mg tablet; Take 1 tablet (5 mg) by mouth daily or as directed by anticoagulation clinic.  -     Clinic Appointment Request Virtual Est (end of day video); Future  -     Protime-INR; Future      Caitlyn Pedroza MD  Hematology/Oncology  [facility]      Scribe Attestation  By signing my name below, Nicole SORIANO Scribe, attest that this documentation has been prepared under the direction and in the presence of Caitlyn Pedroza MD.    Provider Attestation  Caitlyn SORIANO MD, personally performed the services described in the documentation as scribed by Nicole Camarena, in my presence, and confirm it is both accurate and complete.    Time Spent  Prep time on day of patient encounter: 5 minutes  Time spent directly with patient, family or caregiver: 15 minutes  Additional Time Spent on Patient Care Activities: 5 minutes  Documentation Time: 5 minutes  Other Time Spent: 0 minutes  Total: 30 minutes

## 2025-08-01 ENCOUNTER — OFFICE VISIT (OUTPATIENT)
Dept: HEMATOLOGY/ONCOLOGY | Facility: CLINIC | Age: 45
End: 2025-08-01
Payer: COMMERCIAL

## 2025-08-01 ENCOUNTER — LAB (OUTPATIENT)
Dept: LAB | Facility: CLINIC | Age: 45
End: 2025-08-01
Payer: COMMERCIAL

## 2025-08-01 VITALS
TEMPERATURE: 98.1 F | WEIGHT: 214.51 LBS | OXYGEN SATURATION: 96 % | HEART RATE: 73 BPM | SYSTOLIC BLOOD PRESSURE: 107 MMHG | DIASTOLIC BLOOD PRESSURE: 75 MMHG | BODY MASS INDEX: 34.62 KG/M2 | RESPIRATION RATE: 16 BRPM

## 2025-08-01 DIAGNOSIS — R76.0 ANTIPHOSPHOLIPID ANTIBODY POSITIVE: Primary | ICD-10-CM

## 2025-08-01 DIAGNOSIS — R76.0 ANTIPHOSPHOLIPID ANTIBODY POSITIVE: ICD-10-CM

## 2025-08-01 PROBLEM — G43.909 MIGRAINE: Status: RESOLVED | Noted: 2023-01-18 | Resolved: 2025-08-01

## 2025-08-01 PROBLEM — F32.A DEPRESSION: Status: RESOLVED | Noted: 2023-01-18 | Resolved: 2025-08-01

## 2025-08-01 PROBLEM — F43.21 ADJUSTMENT DISORDER WITH DEPRESSED MOOD: Status: RESOLVED | Noted: 2023-01-18 | Resolved: 2025-08-01

## 2025-08-01 PROBLEM — F41.9 ANXIETY: Status: RESOLVED | Noted: 2017-07-28 | Resolved: 2025-08-01

## 2025-08-01 LAB
BASOPHILS # BLD AUTO: 0.04 X10*3/UL (ref 0–0.1)
BASOPHILS NFR BLD AUTO: 0.6 %
EOSINOPHIL # BLD AUTO: 0.25 X10*3/UL (ref 0–0.7)
EOSINOPHIL NFR BLD AUTO: 3.6 %
ERYTHROCYTE [DISTWIDTH] IN BLOOD BY AUTOMATED COUNT: 11.7 % (ref 11.5–14.5)
HCT VFR BLD AUTO: 43.6 % (ref 36–46)
HGB BLD-MCNC: 14.3 G/DL (ref 12–16)
IMM GRANULOCYTES # BLD AUTO: 0.01 X10*3/UL (ref 0–0.7)
IMM GRANULOCYTES NFR BLD AUTO: 0.1 % (ref 0–0.9)
INR PPP: 1.4 (ref 0.9–1.1)
LYMPHOCYTES # BLD AUTO: 1.41 X10*3/UL (ref 1.2–4.8)
LYMPHOCYTES NFR BLD AUTO: 20.4 %
MCH RBC QN AUTO: 32.4 PG (ref 26–34)
MCHC RBC AUTO-ENTMCNC: 32.8 G/DL (ref 32–36)
MCV RBC AUTO: 99 FL (ref 80–100)
MONOCYTES # BLD AUTO: 0.65 X10*3/UL (ref 0.1–1)
MONOCYTES NFR BLD AUTO: 9.4 %
NEUTROPHILS # BLD AUTO: 4.56 X10*3/UL (ref 1.2–7.7)
NEUTROPHILS NFR BLD AUTO: 65.9 %
NRBC BLD-RTO: NORMAL /100{WBCS}
PLATELET # BLD AUTO: 246 X10*3/UL (ref 150–450)
PROTHROMBIN TIME: 15.8 SECONDS (ref 9.8–12.4)
RBC # BLD AUTO: 4.41 X10*6/UL (ref 4–5.2)
WBC # BLD AUTO: 6.9 X10*3/UL (ref 4.4–11.3)

## 2025-08-01 PROCEDURE — 85610 PROTHROMBIN TIME: CPT

## 2025-08-01 PROCEDURE — 99214 OFFICE O/P EST MOD 30 MIN: CPT | Performed by: INTERNAL MEDICINE

## 2025-08-01 PROCEDURE — 85025 COMPLETE CBC W/AUTO DIFF WBC: CPT

## 2025-08-01 PROCEDURE — 36415 COLL VENOUS BLD VENIPUNCTURE: CPT

## 2025-08-01 RX ORDER — WARFARIN SODIUM 5 MG/1
TABLET ORAL
Qty: 30 TABLET | Refills: 5 | Status: SHIPPED | OUTPATIENT
Start: 2025-08-01 | End: 2026-03-29

## 2025-08-01 ASSESSMENT — PAIN SCALES - GENERAL: PAINLEVEL_OUTOF10: 0-NO PAIN

## 2025-08-02 LAB — HOLD SPECIMEN: NORMAL

## 2025-08-07 ENCOUNTER — TELEPHONE (OUTPATIENT)
Dept: GASTROENTEROLOGY | Facility: CLINIC | Age: 45
End: 2025-08-07
Payer: COMMERCIAL

## 2025-08-08 ENCOUNTER — OFFICE VISIT (OUTPATIENT)
Dept: GASTROENTEROLOGY | Facility: CLINIC | Age: 45
End: 2025-08-08
Payer: COMMERCIAL

## 2025-08-08 VITALS
OXYGEN SATURATION: 98 % | SYSTOLIC BLOOD PRESSURE: 117 MMHG | BODY MASS INDEX: 34.72 KG/M2 | HEIGHT: 66 IN | TEMPERATURE: 98.4 F | HEART RATE: 77 BPM | WEIGHT: 216 LBS | RESPIRATION RATE: 17 BRPM | DIASTOLIC BLOOD PRESSURE: 83 MMHG

## 2025-08-08 DIAGNOSIS — K76.89 LIVER CYST: Primary | ICD-10-CM

## 2025-08-08 DIAGNOSIS — R10.84 GENERALIZED ABDOMINAL PAIN: ICD-10-CM

## 2025-08-08 DIAGNOSIS — K58.9 IRRITABLE BOWEL SYNDROME, UNSPECIFIED TYPE: ICD-10-CM

## 2025-08-08 DIAGNOSIS — K64.9 HEMORRHOIDS, UNSPECIFIED HEMORRHOID TYPE: ICD-10-CM

## 2025-08-08 DIAGNOSIS — D12.6 COLON ADENOMA: ICD-10-CM

## 2025-08-08 PROCEDURE — 1036F TOBACCO NON-USER: CPT | Performed by: STUDENT IN AN ORGANIZED HEALTH CARE EDUCATION/TRAINING PROGRAM

## 2025-08-08 PROCEDURE — 3008F BODY MASS INDEX DOCD: CPT | Performed by: STUDENT IN AN ORGANIZED HEALTH CARE EDUCATION/TRAINING PROGRAM

## 2025-08-08 PROCEDURE — 99215 OFFICE O/P EST HI 40 MIN: CPT | Performed by: STUDENT IN AN ORGANIZED HEALTH CARE EDUCATION/TRAINING PROGRAM

## 2025-08-08 RX ORDER — ONDANSETRON HYDROCHLORIDE 2 MG/ML
4 INJECTION, SOLUTION INTRAVENOUS ONCE AS NEEDED
OUTPATIENT
Start: 2025-08-08

## 2025-08-08 RX ORDER — POLYETHYLENE GLYCOL 3350 17 G/17G
17 POWDER, FOR SOLUTION ORAL DAILY
Qty: 30 PACKET | Refills: 11 | Status: SHIPPED | OUTPATIENT
Start: 2025-08-08 | End: 2026-08-08

## 2025-08-08 NOTE — PROGRESS NOTES
4/2023  42-year-old lady with history of depression anemia back pain migraine, DVT, antiphospholipid syndrome on Xarelto presenting with 6-month history of intermittent right upper quadrant discomfort. The patient mentions right upper quadrant pain, ill-defined, not at night, lasting for hours, 4 times a week, lasting for hours, partially relieved by passing gas and bowel movements. She denies any nausea vomiting heartburn dysphagia odynophagia melena hematochezia hematemesis. She mentions losing 10 pounds intentionally over the last year.       8/8/2025  Patient seen for follow-up, mentions worsening intermittent epigastric cramping also in the right upper quadrant, around 5 times a week, not at night, on and off, lasting for about an hour, did not get better after Pepcid, it is better after passing gas and having bowel movements, she is having 1 bowel movement up to every 2 days, [place] 2-4    EGD never  Colonoscopy due 2024 [facility] 9, hemorrhoids, transverse TA, repeat in 5 years  Family history reviewed, not pertinent to chief complaint  Normally has 1 bowel movement per day, straining, currently as above  Denies NSAIDs, social alcohol use, denies marijuana drug use smoking                 The note was created using voice recognition transcription software. Despite proofreading, unintentional typographical errors may be present. Please contact the GI office with any questions or concerns.     Current Medications: reviewed    A 10 point review of system is negative except for what is mentioned in the HPI      Follow up with primary provider was advised for non GI symptoms and findings    Follow up with GI was advised     Vital Signs: Reviewed    Physical Exam:  General: no apparent distress  Skin:  Warm and dry, no jaundice  HEENT: No scleral icterus, no conjunctival pallor, normocephalic, atraumatic, mucous membranes moist  Neck:  atraumatic, trachea midline, no JVD  Chest:  decreased air entry to auscultation  bilaterally. No wheezes, rales, or rhonchi  CV:  Regular rate and rhythm.  Positive S1/S2  Abdomen: no distension, +BS, soft, mild upper abdominal tender to palpation, no rebound tenderness, no guarding, no rigidity, no discernible ascites   Extremities: lower extremity edema, Chronic pigmentary changes, no cyanosis  Neurological:  A&Ox3 , no asterixis  Psychiatric: cooperative     Investigations:  Labs, radiological imaging and cardiac work up were reviewed       1- hepatitis C negative in 11/2019     2-BMI 34 down from 36, healthy lifestyle advised     3-Healthcare maintenance, colonoscopy as above, repeat in 2029 after 3 days of once daily MiraLAX in addition to prep     4-right upper quadrant discomfort described as above, likely multifactorial  *ultrasound abdomen 1/2023 showing 2.2 cm septated cyst, 8/2025 liver MRI 1.9 cm minimally complex cyst with thin septations likely benign with 4 mm right lobe cyst see official report, stable on ultrasound 8/2025, *case discussed during hepatobiliary board meeting on 5/15/23, recommend repeat us abd q6 months for 2 years to monitor size, no need for further cross sectional imaging for now , will follow  *Possible component of IBS, stools on x-ray to 2025, start MiraLAX half to 1 capful daily, Gas-X as needed, can consider cross-sectional imaging in view of antiphospholipid syndrome  *Will need to rule out peptic ulcer disease, check EGD, currently on Xarelto being changed to Coumadin because of antiphospholipid syndrome    5-iron deficiency anemia in 5/2022, iron saturation 11%, TIBC 413, ferritin 22, occurred as a post delivery bleeding, currently hemoglobin normalized, denies any melena hematochezia or hematemesis, will follow

## 2025-08-08 NOTE — PATIENT INSTRUCTIONS
1-for your abdominal discomfort we need to schedule for an upper endoscopy.  Start taking MiraLAX half a capful every day, adjust as needed and you can increase to 1 capful a day and even more in order to feel like you are emptying better when you have a bowel movement and to prevent the episodes of abdominal pain that you are having.  When you have the pain you can take Gas-X over-the-counter 3 or 4 times a day as needed  -Increase water intake [ About 11.5 cups (2.7 liters = 91 oz) of fluids a day for women]  -Increase fiber intake (fruits such as prunes, kiwi, strawberries, raspberries, and apples - vegetables such as broccoli, lima beans, and carrots - whole-grain breads such as seven-grain, cracked wheat, or pumpernickel).  You can have fiber 1 cereal 1 bowl once or twice a day.  Add fiber foods slowly not all at once!  -Change your bathroom posture: Toilet footstools or Squatty Potty!!! Try to lean slightly.  -Try to poop at the same time every day (such as in the morning at home, after you eat breakfast). Try to not hold it in or put off a bowel movement.   -Increase physical activity as tolerated.  2-your next colonoscopy is recommended in 2029 unless indicated otherwise

## 2025-08-13 PROBLEM — E66.811 CLASS 1 OBESITY WITH BODY MASS INDEX (BMI) OF 34.0 TO 34.9 IN ADULT: Status: ACTIVE | Noted: 2023-12-29

## 2025-08-14 DIAGNOSIS — D80.6 ANTI-POLYSACCHARIDE ANTIBODY DEFICIENCY (MULTI): ICD-10-CM

## 2025-08-22 DIAGNOSIS — R76.0 ANTIPHOSPHOLIPID ANTIBODY POSITIVE: ICD-10-CM

## 2025-08-22 LAB
S PN DA SERO 19F IGG SER-MCNC: 0.8 UG/ML
S PNEUM DA 1 IGG SER-MCNC: 16.9 UG/ML
S PNEUM DA 10A IGG SER-MCNC: 1.6 UG/ML
S PNEUM DA 11A IGG SER-MCNC: 0.5 UG/ML
S PNEUM DA 12F IGG SER-MCNC: 0.4 UG/ML
S PNEUM DA 14 IGG SER-MCNC: 1
S PNEUM DA 15B IGG SER-MCNC: 0.9 UG/ML
S PNEUM DA 17F IGG SER-MCNC: 1.7 UG/ML
S PNEUM DA 18C IGG SER-MCNC: 17
S PNEUM DA 19A IGG SER-MCNC: 39.5 UG/ML
S PNEUM DA 2 IGG SER-MCNC: 9.9 UG/ML
S PNEUM DA 20A IGG SER-MCNC: 14.4 UG/ML
S PNEUM DA 22F IGG SER-MCNC: 2.8 UG/ML
S PNEUM DA 23F IGG SER-MCNC: 0.7 UG/ML
S PNEUM DA 3 IGG SER-MCNC: 2.4 UG/ML
S PNEUM DA 33F IGG SER-MCNC: 31.5 UG/ML
S PNEUM DA 4 IGG SER-MCNC: 0.9 UG/ML
S PNEUM DA 5 IGG SER-MCNC: 0.8 UG/ML
S PNEUM DA 6B IGG SER-MCNC: 2 UG/ML
S PNEUM DA 7F IGG SER-MCNC: 54.7 UG/ML
S PNEUM DA 8 IGG SER-MCNC: 6.8 UG/ML
S PNEUM DA 9N IGG SER-MCNC: 0.6 UG/ML
S PNEUM DA 9V IGG SER-MCNC: 0.5 UG/ML

## 2025-09-05 ENCOUNTER — APPOINTMENT (OUTPATIENT)
Dept: CARDIOLOGY | Facility: CLINIC | Age: 45
End: 2025-09-05
Payer: COMMERCIAL

## 2025-09-17 ENCOUNTER — APPOINTMENT (OUTPATIENT)
Dept: GASTROENTEROLOGY | Facility: EXTERNAL LOCATION | Age: 45
End: 2025-09-17
Payer: COMMERCIAL

## 2025-09-18 ENCOUNTER — APPOINTMENT (OUTPATIENT)
Dept: DERMATOLOGY | Facility: CLINIC | Age: 45
End: 2025-09-18
Payer: COMMERCIAL

## 2025-10-08 ENCOUNTER — APPOINTMENT (OUTPATIENT)
Dept: PRIMARY CARE | Facility: CLINIC | Age: 45
End: 2025-10-08
Payer: COMMERCIAL

## 2025-10-21 ENCOUNTER — APPOINTMENT (OUTPATIENT)
Dept: GASTROENTEROLOGY | Facility: HOSPITAL | Age: 45
End: 2025-10-21
Payer: COMMERCIAL